# Patient Record
Sex: MALE | Race: BLACK OR AFRICAN AMERICAN | NOT HISPANIC OR LATINO | URBAN - METROPOLITAN AREA
[De-identification: names, ages, dates, MRNs, and addresses within clinical notes are randomized per-mention and may not be internally consistent; named-entity substitution may affect disease eponyms.]

---

## 2017-10-27 ENCOUNTER — OUTPATIENT (OUTPATIENT)
Dept: OUTPATIENT SERVICES | Facility: HOSPITAL | Age: 52
LOS: 1 days | End: 2017-10-27
Payer: MEDICARE

## 2017-10-27 DIAGNOSIS — Z98.89 OTHER SPECIFIED POSTPROCEDURAL STATES: Chronic | ICD-10-CM

## 2017-10-27 DIAGNOSIS — Z51.89 ENCOUNTER FOR OTHER SPECIFIED AFTERCARE: ICD-10-CM

## 2017-10-27 DIAGNOSIS — M25.562 PAIN IN LEFT KNEE: ICD-10-CM

## 2017-10-27 DIAGNOSIS — M62.830 MUSCLE SPASM OF BACK: ICD-10-CM

## 2017-11-21 PROCEDURE — G8978: CPT | Mod: CM

## 2017-11-21 PROCEDURE — 97010 HOT OR COLD PACKS THERAPY: CPT

## 2017-11-21 PROCEDURE — 97110 THERAPEUTIC EXERCISES: CPT

## 2017-11-21 PROCEDURE — 97140 MANUAL THERAPY 1/> REGIONS: CPT

## 2017-11-21 PROCEDURE — G8979: CPT | Mod: CL

## 2017-11-21 PROCEDURE — 97163 PT EVAL HIGH COMPLEX 45 MIN: CPT

## 2017-12-13 ENCOUNTER — OTHER (OUTPATIENT)
Age: 52
End: 2017-12-13

## 2017-12-13 DIAGNOSIS — M25.569 PAIN IN UNSPECIFIED KNEE: ICD-10-CM

## 2017-12-14 ENCOUNTER — APPOINTMENT (OUTPATIENT)
Dept: ORTHOPEDIC SURGERY | Facility: CLINIC | Age: 52
End: 2017-12-14
Payer: MEDICARE

## 2017-12-14 VITALS
TEMPERATURE: 98 F | SYSTOLIC BLOOD PRESSURE: 144 MMHG | HEIGHT: 69 IN | BODY MASS INDEX: 26.07 KG/M2 | HEART RATE: 88 BPM | DIASTOLIC BLOOD PRESSURE: 90 MMHG | WEIGHT: 176 LBS

## 2017-12-14 DIAGNOSIS — Z80.9 FAMILY HISTORY OF MALIGNANT NEOPLASM, UNSPECIFIED: ICD-10-CM

## 2017-12-14 DIAGNOSIS — Z87.09 PERSONAL HISTORY OF OTHER DISEASES OF THE RESPIRATORY SYSTEM: ICD-10-CM

## 2017-12-14 DIAGNOSIS — Z86.39 PERSONAL HISTORY OF OTHER ENDOCRINE, NUTRITIONAL AND METABOLIC DISEASE: ICD-10-CM

## 2017-12-14 DIAGNOSIS — Z82.61 FAMILY HISTORY OF ARTHRITIS: ICD-10-CM

## 2017-12-14 DIAGNOSIS — Z87.891 PERSONAL HISTORY OF NICOTINE DEPENDENCE: ICD-10-CM

## 2017-12-14 DIAGNOSIS — Z86.79 PERSONAL HISTORY OF OTHER DISEASES OF THE CIRCULATORY SYSTEM: ICD-10-CM

## 2017-12-14 DIAGNOSIS — Z87.39 PERSONAL HISTORY OF OTHER DISEASES OF THE MUSCULOSKELETAL SYSTEM AND CONNECTIVE TISSUE: ICD-10-CM

## 2017-12-14 DIAGNOSIS — Z78.9 OTHER SPECIFIED HEALTH STATUS: ICD-10-CM

## 2017-12-14 PROCEDURE — 99203 OFFICE O/P NEW LOW 30 MIN: CPT

## 2017-12-14 PROCEDURE — 73562 X-RAY EXAM OF KNEE 3: CPT | Mod: LT

## 2018-01-25 ENCOUNTER — APPOINTMENT (OUTPATIENT)
Dept: ORTHOPEDIC SURGERY | Facility: CLINIC | Age: 53
End: 2018-01-25
Payer: MEDICARE

## 2018-01-25 VITALS
SYSTOLIC BLOOD PRESSURE: 148 MMHG | HEART RATE: 89 BPM | DIASTOLIC BLOOD PRESSURE: 93 MMHG | HEIGHT: 69 IN | BODY MASS INDEX: 26.07 KG/M2 | WEIGHT: 176 LBS

## 2018-01-25 PROCEDURE — 99213 OFFICE O/P EST LOW 20 MIN: CPT

## 2018-01-31 ENCOUNTER — OUTPATIENT (OUTPATIENT)
Dept: OUTPATIENT SERVICES | Facility: HOSPITAL | Age: 53
LOS: 1 days | End: 2018-01-31
Payer: MEDICARE

## 2018-01-31 DIAGNOSIS — Z51.89 ENCOUNTER FOR OTHER SPECIFIED AFTERCARE: ICD-10-CM

## 2018-01-31 DIAGNOSIS — Z96.652 PRESENCE OF LEFT ARTIFICIAL KNEE JOINT: ICD-10-CM

## 2018-01-31 DIAGNOSIS — Z98.89 OTHER SPECIFIED POSTPROCEDURAL STATES: Chronic | ICD-10-CM

## 2018-01-31 DIAGNOSIS — S83.412D SPRAIN OF MEDIAL COLLATERAL LIGAMENT OF LEFT KNEE, SUBSEQUENT ENCOUNTER: ICD-10-CM

## 2018-02-21 PROCEDURE — G8979: CPT | Mod: CL

## 2018-02-21 PROCEDURE — 97110 THERAPEUTIC EXERCISES: CPT

## 2018-02-21 PROCEDURE — G8978: CPT | Mod: CM

## 2018-02-21 PROCEDURE — 97163 PT EVAL HIGH COMPLEX 45 MIN: CPT

## 2018-02-21 PROCEDURE — 97010 HOT OR COLD PACKS THERAPY: CPT

## 2018-03-15 ENCOUNTER — APPOINTMENT (OUTPATIENT)
Dept: ORTHOPEDIC SURGERY | Facility: CLINIC | Age: 53
End: 2018-03-15
Payer: MEDICARE

## 2018-03-15 VITALS
WEIGHT: 176 LBS | DIASTOLIC BLOOD PRESSURE: 95 MMHG | BODY MASS INDEX: 26.07 KG/M2 | SYSTOLIC BLOOD PRESSURE: 150 MMHG | HEIGHT: 69 IN | HEART RATE: 88 BPM

## 2018-03-15 DIAGNOSIS — S83.412D SPRAIN OF MEDIAL COLLATERAL LIGAMENT OF LEFT KNEE, SUBSEQUENT ENCOUNTER: ICD-10-CM

## 2018-03-15 PROCEDURE — 73562 X-RAY EXAM OF KNEE 3: CPT | Mod: LT

## 2018-03-15 PROCEDURE — 99213 OFFICE O/P EST LOW 20 MIN: CPT

## 2018-04-05 ENCOUNTER — OTHER (OUTPATIENT)
Age: 53
End: 2018-04-05

## 2018-04-12 ENCOUNTER — APPOINTMENT (OUTPATIENT)
Dept: ORTHOPEDIC SURGERY | Facility: CLINIC | Age: 53
End: 2018-04-12

## 2018-04-18 ENCOUNTER — EMERGENCY (EMERGENCY)
Facility: HOSPITAL | Age: 53
LOS: 1 days | Discharge: HOME | End: 2018-04-18
Attending: EMERGENCY MEDICINE | Admitting: EMERGENCY MEDICINE
Payer: MEDICARE

## 2018-04-18 VITALS — HEIGHT: 69 IN | WEIGHT: 192.9 LBS

## 2018-04-18 VITALS
DIASTOLIC BLOOD PRESSURE: 77 MMHG | RESPIRATION RATE: 18 BRPM | HEART RATE: 81 BPM | SYSTOLIC BLOOD PRESSURE: 146 MMHG | OXYGEN SATURATION: 100 %

## 2018-04-18 DIAGNOSIS — Z98.89 OTHER SPECIFIED POSTPROCEDURAL STATES: Chronic | ICD-10-CM

## 2018-04-18 LAB
ALBUMIN SERPL ELPH-MCNC: 4.8 G/DL — SIGNIFICANT CHANGE UP (ref 3.3–5.2)
ALP SERPL-CCNC: 45 U/L — SIGNIFICANT CHANGE UP (ref 40–120)
ALT FLD-CCNC: 35 U/L — SIGNIFICANT CHANGE UP
ANION GAP SERPL CALC-SCNC: 17 MMOL/L — SIGNIFICANT CHANGE UP (ref 5–17)
APPEARANCE UR: CLEAR — SIGNIFICANT CHANGE UP
AST SERPL-CCNC: 21 U/L — SIGNIFICANT CHANGE UP
BASOPHILS # BLD AUTO: 0 K/UL — SIGNIFICANT CHANGE UP (ref 0–0.2)
BASOPHILS NFR BLD AUTO: 0.2 % — SIGNIFICANT CHANGE UP (ref 0–2)
BILIRUB SERPL-MCNC: 0.5 MG/DL — SIGNIFICANT CHANGE UP (ref 0.4–2)
BILIRUB UR-MCNC: NEGATIVE — SIGNIFICANT CHANGE UP
BUN SERPL-MCNC: 9 MG/DL — SIGNIFICANT CHANGE UP (ref 8–20)
CALCIUM SERPL-MCNC: 10.1 MG/DL — SIGNIFICANT CHANGE UP (ref 8.6–10.2)
CHLORIDE SERPL-SCNC: 98 MMOL/L — SIGNIFICANT CHANGE UP (ref 98–107)
CO2 SERPL-SCNC: 23 MMOL/L — SIGNIFICANT CHANGE UP (ref 22–29)
COLOR SPEC: YELLOW — SIGNIFICANT CHANGE UP
CREAT SERPL-MCNC: 0.98 MG/DL — SIGNIFICANT CHANGE UP (ref 0.5–1.3)
DIFF PNL FLD: ABNORMAL
EOSINOPHIL # BLD AUTO: 0 K/UL — SIGNIFICANT CHANGE UP (ref 0–0.5)
EOSINOPHIL NFR BLD AUTO: 0 % — SIGNIFICANT CHANGE UP (ref 0–5)
EPI CELLS # UR: SIGNIFICANT CHANGE UP
GLUCOSE SERPL-MCNC: 124 MG/DL — HIGH (ref 70–115)
GLUCOSE UR QL: NEGATIVE MG/DL — SIGNIFICANT CHANGE UP
HCT VFR BLD CALC: 47.7 % — SIGNIFICANT CHANGE UP (ref 42–52)
HGB BLD-MCNC: 16.7 G/DL — SIGNIFICANT CHANGE UP (ref 14–18)
KETONES UR-MCNC: ABNORMAL
LEUKOCYTE ESTERASE UR-ACNC: ABNORMAL
LIDOCAIN IGE QN: 24 U/L — SIGNIFICANT CHANGE UP (ref 22–51)
LYMPHOCYTES # BLD AUTO: 1.8 K/UL — SIGNIFICANT CHANGE UP (ref 1–4.8)
LYMPHOCYTES # BLD AUTO: 21.7 % — SIGNIFICANT CHANGE UP (ref 20–55)
MCHC RBC-ENTMCNC: 31.2 PG — HIGH (ref 27–31)
MCHC RBC-ENTMCNC: 35 G/DL — SIGNIFICANT CHANGE UP (ref 32–36)
MCV RBC AUTO: 89.2 FL — SIGNIFICANT CHANGE UP (ref 80–94)
MONOCYTES # BLD AUTO: 0.7 K/UL — SIGNIFICANT CHANGE UP (ref 0–0.8)
MONOCYTES NFR BLD AUTO: 8.4 % — SIGNIFICANT CHANGE UP (ref 3–10)
NEUTROPHILS # BLD AUTO: 5.6 K/UL — SIGNIFICANT CHANGE UP (ref 1.8–8)
NEUTROPHILS NFR BLD AUTO: 69.5 % — SIGNIFICANT CHANGE UP (ref 37–73)
NITRITE UR-MCNC: NEGATIVE — SIGNIFICANT CHANGE UP
PH UR: 6 — SIGNIFICANT CHANGE UP (ref 5–8)
PLATELET # BLD AUTO: 261 K/UL — SIGNIFICANT CHANGE UP (ref 150–400)
POTASSIUM SERPL-MCNC: 3.6 MMOL/L — SIGNIFICANT CHANGE UP (ref 3.5–5.3)
POTASSIUM SERPL-SCNC: 3.6 MMOL/L — SIGNIFICANT CHANGE UP (ref 3.5–5.3)
PROT SERPL-MCNC: 8.5 G/DL — SIGNIFICANT CHANGE UP (ref 6.6–8.7)
PROT UR-MCNC: 30 MG/DL
RBC # BLD: 5.35 M/UL — SIGNIFICANT CHANGE UP (ref 4.6–6.2)
RBC # FLD: 13.2 % — SIGNIFICANT CHANGE UP (ref 11–15.6)
RBC CASTS # UR COMP ASSIST: SIGNIFICANT CHANGE UP /HPF (ref 0–4)
SODIUM SERPL-SCNC: 138 MMOL/L — SIGNIFICANT CHANGE UP (ref 135–145)
SP GR SPEC: 1.02 — SIGNIFICANT CHANGE UP (ref 1.01–1.02)
UROBILINOGEN FLD QL: NEGATIVE MG/DL — SIGNIFICANT CHANGE UP
WBC # BLD: 8.1 K/UL — SIGNIFICANT CHANGE UP (ref 4.8–10.8)
WBC # FLD AUTO: 8.1 K/UL — SIGNIFICANT CHANGE UP (ref 4.8–10.8)
WBC UR QL: SIGNIFICANT CHANGE UP

## 2018-04-18 PROCEDURE — 83690 ASSAY OF LIPASE: CPT

## 2018-04-18 PROCEDURE — 80053 COMPREHEN METABOLIC PANEL: CPT

## 2018-04-18 PROCEDURE — 81001 URINALYSIS AUTO W/SCOPE: CPT

## 2018-04-18 PROCEDURE — 99284 EMERGENCY DEPT VISIT MOD MDM: CPT | Mod: 25

## 2018-04-18 PROCEDURE — 74176 CT ABD & PELVIS W/O CONTRAST: CPT | Mod: 26

## 2018-04-18 PROCEDURE — 85027 COMPLETE CBC AUTOMATED: CPT

## 2018-04-18 PROCEDURE — 74176 CT ABD & PELVIS W/O CONTRAST: CPT

## 2018-04-18 PROCEDURE — 99284 EMERGENCY DEPT VISIT MOD MDM: CPT

## 2018-04-18 PROCEDURE — 96375 TX/PRO/DX INJ NEW DRUG ADDON: CPT

## 2018-04-18 PROCEDURE — 36415 COLL VENOUS BLD VENIPUNCTURE: CPT

## 2018-04-18 PROCEDURE — 96374 THER/PROPH/DIAG INJ IV PUSH: CPT

## 2018-04-18 RX ORDER — MORPHINE SULFATE 50 MG/1
8 CAPSULE, EXTENDED RELEASE ORAL ONCE
Qty: 0 | Refills: 0 | Status: DISCONTINUED | OUTPATIENT
Start: 2018-04-18 | End: 2018-04-18

## 2018-04-18 RX ORDER — SODIUM CHLORIDE 9 MG/ML
3 INJECTION INTRAMUSCULAR; INTRAVENOUS; SUBCUTANEOUS ONCE
Qty: 0 | Refills: 0 | Status: COMPLETED | OUTPATIENT
Start: 2018-04-18 | End: 2018-04-18

## 2018-04-18 RX ORDER — MORPHINE SULFATE 50 MG/1
4 CAPSULE, EXTENDED RELEASE ORAL ONCE
Qty: 0 | Refills: 0 | Status: COMPLETED | OUTPATIENT
Start: 2018-04-18 | End: 2018-04-18

## 2018-04-18 RX ORDER — ONDANSETRON 8 MG/1
4 TABLET, FILM COATED ORAL ONCE
Qty: 0 | Refills: 0 | Status: COMPLETED | OUTPATIENT
Start: 2018-04-18 | End: 2018-04-18

## 2018-04-18 RX ORDER — KETOROLAC TROMETHAMINE 30 MG/ML
30 SYRINGE (ML) INJECTION ONCE
Qty: 0 | Refills: 0 | Status: DISCONTINUED | OUTPATIENT
Start: 2018-04-18 | End: 2018-04-18

## 2018-04-18 RX ORDER — CYCLOBENZAPRINE HYDROCHLORIDE 10 MG/1
1 TABLET, FILM COATED ORAL
Qty: 30 | Refills: 0 | OUTPATIENT
Start: 2018-04-18 | End: 2018-04-27

## 2018-04-18 RX ADMIN — ONDANSETRON 4 MILLIGRAM(S): 8 TABLET, FILM COATED ORAL at 08:43

## 2018-04-18 RX ADMIN — SODIUM CHLORIDE 3 MILLILITER(S): 9 INJECTION INTRAMUSCULAR; INTRAVENOUS; SUBCUTANEOUS at 08:35

## 2018-04-18 RX ADMIN — Medication 30 MILLIGRAM(S): at 08:39

## 2018-04-18 RX ADMIN — MORPHINE SULFATE 8 MILLIGRAM(S): 50 CAPSULE, EXTENDED RELEASE ORAL at 08:51

## 2018-04-18 NOTE — ED ADULT NURSE REASSESSMENT NOTE - NS ED NURSE REASSESS COMMENT FT1
wife at desk and stating 'can he get anything for pain' 'its getting worse and worse by the minute'   Dr Crespo at bedside

## 2018-04-18 NOTE — ED ADULT NURSE REASSESSMENT NOTE - NS ED NURSE REASSESS COMMENT FT1
pt comfortable sitting in RW with wife, "pain is better, its about a - right now"   waiting MD dispo

## 2018-04-18 NOTE — ED PROVIDER NOTE - OBJECTIVE STATEMENT
c/o right flank pain radiating to the groin associated with nausea and vomiting. hx of knee surgery 2 months ago takes 5 percocets dialy 10 mgms each daily last took percocet yesterday Pain present for the last 4-5 days hx of diabetes and bipolar disorder. no smoking no drinking no allergies no hx of stones

## 2018-04-18 NOTE — ED ADULT NURSE NOTE - OBJECTIVE STATEMENT
pt presents to ED with right flank pain x one week. pt c/o n/v since yesterday. pt has see by PMD and given RX of oxycodone for pain. denies urinary complaints. a&ox3. breathing si even and unlabored. will continue to monitor and reassess

## 2018-04-18 NOTE — ED PROVIDER NOTE - CONSTITUTIONAL, MLM
normal... Well appearing, well nourished, awake, alert, oriented to person, place, time/situation and in severe distress

## 2018-05-30 ENCOUNTER — OUTPATIENT (OUTPATIENT)
Dept: OUTPATIENT SERVICES | Facility: HOSPITAL | Age: 53
LOS: 1 days | End: 2018-05-30
Payer: MEDICARE

## 2018-05-30 DIAGNOSIS — Z51.89 ENCOUNTER FOR OTHER SPECIFIED AFTERCARE: ICD-10-CM

## 2018-05-30 DIAGNOSIS — M54.5 LOW BACK PAIN: ICD-10-CM

## 2018-05-30 DIAGNOSIS — Z98.89 OTHER SPECIFIED POSTPROCEDURAL STATES: Chronic | ICD-10-CM

## 2018-08-08 PROCEDURE — 97163 PT EVAL HIGH COMPLEX 45 MIN: CPT

## 2018-08-08 PROCEDURE — 97010 HOT OR COLD PACKS THERAPY: CPT

## 2018-08-08 PROCEDURE — G8978: CPT | Mod: CM

## 2018-08-08 PROCEDURE — 97110 THERAPEUTIC EXERCISES: CPT

## 2018-08-08 PROCEDURE — 97140 MANUAL THERAPY 1/> REGIONS: CPT

## 2018-08-08 PROCEDURE — G8979: CPT | Mod: CL

## 2018-08-16 ENCOUNTER — EMERGENCY (EMERGENCY)
Facility: HOSPITAL | Age: 53
LOS: 1 days | Discharge: HOME | End: 2018-08-16
Attending: EMERGENCY MEDICINE
Payer: MEDICARE

## 2018-08-16 VITALS
OXYGEN SATURATION: 100 % | RESPIRATION RATE: 18 BRPM | DIASTOLIC BLOOD PRESSURE: 84 MMHG | SYSTOLIC BLOOD PRESSURE: 157 MMHG | HEART RATE: 88 BPM | TEMPERATURE: 98 F

## 2018-08-16 VITALS
DIASTOLIC BLOOD PRESSURE: 87 MMHG | TEMPERATURE: 98 F | WEIGHT: 184.97 LBS | SYSTOLIC BLOOD PRESSURE: 151 MMHG | OXYGEN SATURATION: 100 % | RESPIRATION RATE: 18 BRPM | HEIGHT: 67 IN | HEART RATE: 80 BPM

## 2018-08-16 DIAGNOSIS — Z98.89 OTHER SPECIFIED POSTPROCEDURAL STATES: Chronic | ICD-10-CM

## 2018-08-16 LAB
ALBUMIN SERPL ELPH-MCNC: 5.1 G/DL — SIGNIFICANT CHANGE UP (ref 3.3–5.2)
ALP SERPL-CCNC: 46 U/L — SIGNIFICANT CHANGE UP (ref 40–120)
ALT FLD-CCNC: 54 U/L — HIGH
ANION GAP SERPL CALC-SCNC: 18 MMOL/L — HIGH (ref 5–17)
AST SERPL-CCNC: 35 U/L — SIGNIFICANT CHANGE UP
BASOPHILS # BLD AUTO: 0 K/UL — SIGNIFICANT CHANGE UP (ref 0–0.2)
BASOPHILS NFR BLD AUTO: 0.2 % — SIGNIFICANT CHANGE UP (ref 0–2)
BILIRUB SERPL-MCNC: 0.4 MG/DL — SIGNIFICANT CHANGE UP (ref 0.4–2)
BUN SERPL-MCNC: 17 MG/DL — SIGNIFICANT CHANGE UP (ref 8–20)
CALCIUM SERPL-MCNC: 10.1 MG/DL — SIGNIFICANT CHANGE UP (ref 8.6–10.2)
CHLORIDE SERPL-SCNC: 104 MMOL/L — SIGNIFICANT CHANGE UP (ref 98–107)
CK MB CFR SERPL CALC: 2.8 NG/ML — SIGNIFICANT CHANGE UP (ref 0–6.7)
CK SERPL-CCNC: 460 U/L — HIGH (ref 30–200)
CO2 SERPL-SCNC: 21 MMOL/L — LOW (ref 22–29)
CREAT SERPL-MCNC: 0.95 MG/DL — SIGNIFICANT CHANGE UP (ref 0.5–1.3)
EOSINOPHIL # BLD AUTO: 0 K/UL — SIGNIFICANT CHANGE UP (ref 0–0.5)
EOSINOPHIL NFR BLD AUTO: 0.1 % — SIGNIFICANT CHANGE UP (ref 0–5)
GLUCOSE SERPL-MCNC: 136 MG/DL — HIGH (ref 70–115)
HCT VFR BLD CALC: 46.1 % — SIGNIFICANT CHANGE UP (ref 42–52)
HGB BLD-MCNC: 15.8 G/DL — SIGNIFICANT CHANGE UP (ref 14–18)
LIDOCAIN IGE QN: 90 U/L — HIGH (ref 22–51)
LYMPHOCYTES # BLD AUTO: 1.8 K/UL — SIGNIFICANT CHANGE UP (ref 1–4.8)
LYMPHOCYTES # BLD AUTO: 18.3 % — LOW (ref 20–55)
MCHC RBC-ENTMCNC: 30.9 PG — SIGNIFICANT CHANGE UP (ref 27–31)
MCHC RBC-ENTMCNC: 34.3 G/DL — SIGNIFICANT CHANGE UP (ref 32–36)
MCV RBC AUTO: 90.2 FL — SIGNIFICANT CHANGE UP (ref 80–94)
MONOCYTES # BLD AUTO: 0.8 K/UL — SIGNIFICANT CHANGE UP (ref 0–0.8)
MONOCYTES NFR BLD AUTO: 8.3 % — SIGNIFICANT CHANGE UP (ref 3–10)
NEUTROPHILS # BLD AUTO: 7.3 K/UL — SIGNIFICANT CHANGE UP (ref 1.8–8)
NEUTROPHILS NFR BLD AUTO: 72.8 % — SIGNIFICANT CHANGE UP (ref 37–73)
PLATELET # BLD AUTO: 284 K/UL — SIGNIFICANT CHANGE UP (ref 150–400)
POTASSIUM SERPL-MCNC: 3.8 MMOL/L — SIGNIFICANT CHANGE UP (ref 3.5–5.3)
POTASSIUM SERPL-SCNC: 3.8 MMOL/L — SIGNIFICANT CHANGE UP (ref 3.5–5.3)
PROT SERPL-MCNC: 8.6 G/DL — SIGNIFICANT CHANGE UP (ref 6.6–8.7)
RBC # BLD: 5.11 M/UL — SIGNIFICANT CHANGE UP (ref 4.6–6.2)
RBC # FLD: 13.3 % — SIGNIFICANT CHANGE UP (ref 11–15.6)
SODIUM SERPL-SCNC: 143 MMOL/L — SIGNIFICANT CHANGE UP (ref 135–145)
WBC # BLD: 10 K/UL — SIGNIFICANT CHANGE UP (ref 4.8–10.8)
WBC # FLD AUTO: 10 K/UL — SIGNIFICANT CHANGE UP (ref 4.8–10.8)

## 2018-08-16 PROCEDURE — 82553 CREATINE MB FRACTION: CPT

## 2018-08-16 PROCEDURE — 83690 ASSAY OF LIPASE: CPT

## 2018-08-16 PROCEDURE — 99284 EMERGENCY DEPT VISIT MOD MDM: CPT | Mod: 25

## 2018-08-16 PROCEDURE — 96361 HYDRATE IV INFUSION ADD-ON: CPT

## 2018-08-16 PROCEDURE — 82550 ASSAY OF CK (CPK): CPT

## 2018-08-16 PROCEDURE — 99284 EMERGENCY DEPT VISIT MOD MDM: CPT

## 2018-08-16 PROCEDURE — 36415 COLL VENOUS BLD VENIPUNCTURE: CPT

## 2018-08-16 PROCEDURE — 85027 COMPLETE CBC AUTOMATED: CPT

## 2018-08-16 PROCEDURE — 80053 COMPREHEN METABOLIC PANEL: CPT

## 2018-08-16 PROCEDURE — 74177 CT ABD & PELVIS W/CONTRAST: CPT | Mod: 26

## 2018-08-16 PROCEDURE — 96375 TX/PRO/DX INJ NEW DRUG ADDON: CPT

## 2018-08-16 PROCEDURE — 96374 THER/PROPH/DIAG INJ IV PUSH: CPT | Mod: XU

## 2018-08-16 PROCEDURE — 74177 CT ABD & PELVIS W/CONTRAST: CPT

## 2018-08-16 RX ORDER — SODIUM CHLORIDE 9 MG/ML
1000 INJECTION INTRAMUSCULAR; INTRAVENOUS; SUBCUTANEOUS
Qty: 0 | Refills: 0 | Status: DISCONTINUED | OUTPATIENT
Start: 2018-08-16 | End: 2018-08-21

## 2018-08-16 RX ORDER — KETOROLAC TROMETHAMINE 30 MG/ML
15 SYRINGE (ML) INJECTION ONCE
Qty: 0 | Refills: 0 | Status: DISCONTINUED | OUTPATIENT
Start: 2018-08-16 | End: 2018-08-16

## 2018-08-16 RX ORDER — HALOPERIDOL DECANOATE 100 MG/ML
5 INJECTION INTRAMUSCULAR ONCE
Qty: 0 | Refills: 0 | Status: COMPLETED | OUTPATIENT
Start: 2018-08-16 | End: 2018-08-16

## 2018-08-16 RX ORDER — SODIUM CHLORIDE 9 MG/ML
1000 INJECTION INTRAMUSCULAR; INTRAVENOUS; SUBCUTANEOUS ONCE
Qty: 0 | Refills: 0 | Status: COMPLETED | OUTPATIENT
Start: 2018-08-16 | End: 2018-08-16

## 2018-08-16 RX ORDER — FAMOTIDINE 10 MG/ML
20 INJECTION INTRAVENOUS ONCE
Qty: 0 | Refills: 0 | Status: COMPLETED | OUTPATIENT
Start: 2018-08-16 | End: 2018-08-16

## 2018-08-16 RX ORDER — MORPHINE SULFATE 50 MG/1
4 CAPSULE, EXTENDED RELEASE ORAL ONCE
Qty: 0 | Refills: 0 | Status: DISCONTINUED | OUTPATIENT
Start: 2018-08-16 | End: 2018-08-16

## 2018-08-16 RX ORDER — SODIUM CHLORIDE 9 MG/ML
3 INJECTION INTRAMUSCULAR; INTRAVENOUS; SUBCUTANEOUS ONCE
Qty: 0 | Refills: 0 | Status: COMPLETED | OUTPATIENT
Start: 2018-08-16 | End: 2018-08-16

## 2018-08-16 RX ORDER — ONDANSETRON 8 MG/1
4 TABLET, FILM COATED ORAL ONCE
Qty: 0 | Refills: 0 | Status: COMPLETED | OUTPATIENT
Start: 2018-08-16 | End: 2018-08-16

## 2018-08-16 RX ADMIN — SODIUM CHLORIDE 3 MILLILITER(S): 9 INJECTION INTRAMUSCULAR; INTRAVENOUS; SUBCUTANEOUS at 08:56

## 2018-08-16 RX ADMIN — ONDANSETRON 4 MILLIGRAM(S): 8 TABLET, FILM COATED ORAL at 08:56

## 2018-08-16 RX ADMIN — MORPHINE SULFATE 4 MILLIGRAM(S): 50 CAPSULE, EXTENDED RELEASE ORAL at 08:56

## 2018-08-16 RX ADMIN — HALOPERIDOL DECANOATE 5 MILLIGRAM(S): 100 INJECTION INTRAMUSCULAR at 12:59

## 2018-08-16 RX ADMIN — FAMOTIDINE 20 MILLIGRAM(S): 10 INJECTION INTRAVENOUS at 08:56

## 2018-08-16 RX ADMIN — SODIUM CHLORIDE 125 MILLILITER(S): 9 INJECTION INTRAMUSCULAR; INTRAVENOUS; SUBCUTANEOUS at 09:30

## 2018-08-16 RX ADMIN — SODIUM CHLORIDE 1000 MILLILITER(S): 9 INJECTION INTRAMUSCULAR; INTRAVENOUS; SUBCUTANEOUS at 09:22

## 2018-08-16 RX ADMIN — SODIUM CHLORIDE 1000 MILLILITER(S): 9 INJECTION INTRAMUSCULAR; INTRAVENOUS; SUBCUTANEOUS at 08:58

## 2018-08-16 RX ADMIN — MORPHINE SULFATE 4 MILLIGRAM(S): 50 CAPSULE, EXTENDED RELEASE ORAL at 09:22

## 2018-08-16 RX ADMIN — SODIUM CHLORIDE 1000 MILLILITER(S): 9 INJECTION INTRAMUSCULAR; INTRAVENOUS; SUBCUTANEOUS at 14:09

## 2018-08-16 RX ADMIN — Medication 15 MILLIGRAM(S): at 09:22

## 2018-08-16 RX ADMIN — Medication 15 MILLIGRAM(S): at 08:56

## 2018-08-16 NOTE — ED PROVIDER NOTE - OBJECTIVE STATEMENT
54 yo male presents for evaluation of persistent nausea and vomiting for the past three days. He was diagnosed with gastroenteritis about one week ago.

## 2018-08-16 NOTE — ED ADULT NURSE NOTE - OBJECTIVE STATEMENT
pt alert and awake x3, arrived to ED with abd pain associated with n/v x3 days, denies any abd surgeries, denies HA, denies chest pain/sob. will continue to monitor.

## 2018-08-16 NOTE — ED ADULT NURSE NOTE - NSIMPLEMENTINTERV_GEN_ALL_ED
Implemented All Universal Safety Interventions:  [place] to call system. Call bell, personal items and telephone within reach. Instruct patient to call for assistance. Room bathroom lighting operational. Non-slip footwear when patient is off stretcher. Physically safe environment: no spills, clutter or unnecessary equipment. Stretcher in lowest position, wheels locked, appropriate side rails in place.

## 2018-09-12 ENCOUNTER — APPOINTMENT (OUTPATIENT)
Dept: GASTROENTEROLOGY | Facility: CLINIC | Age: 53
End: 2018-09-12
Payer: MEDICARE

## 2018-09-12 VITALS
HEART RATE: 74 BPM | SYSTOLIC BLOOD PRESSURE: 154 MMHG | OXYGEN SATURATION: 98 % | BODY MASS INDEX: 25.92 KG/M2 | HEIGHT: 69 IN | WEIGHT: 175 LBS | DIASTOLIC BLOOD PRESSURE: 104 MMHG

## 2018-09-12 PROCEDURE — 99204 OFFICE O/P NEW MOD 45 MIN: CPT

## 2018-09-12 PROCEDURE — 82270 OCCULT BLOOD FECES: CPT

## 2018-09-23 ENCOUNTER — FORM ENCOUNTER (OUTPATIENT)
Age: 53
End: 2018-09-23

## 2018-09-24 ENCOUNTER — OUTPATIENT (OUTPATIENT)
Dept: OUTPATIENT SERVICES | Facility: HOSPITAL | Age: 53
LOS: 1 days | End: 2018-09-24
Payer: MEDICARE

## 2018-09-24 DIAGNOSIS — R11.14 BILIOUS VOMITING: ICD-10-CM

## 2018-09-24 DIAGNOSIS — Z98.89 OTHER SPECIFIED POSTPROCEDURAL STATES: Chronic | ICD-10-CM

## 2018-09-24 LAB — GLUCOSE BLDC GLUCOMTR-MCNC: 111 MG/DL — HIGH (ref 70–99)

## 2018-09-24 PROCEDURE — 78264 GASTRIC EMPTYING IMG STUDY: CPT | Mod: 26,GC

## 2018-09-24 PROCEDURE — 76700 US EXAM ABDOM COMPLETE: CPT | Mod: 26

## 2018-09-25 ENCOUNTER — INPATIENT (INPATIENT)
Facility: HOSPITAL | Age: 53
LOS: 0 days | Discharge: HOME | DRG: 392 | End: 2018-09-26
Attending: INTERNAL MEDICINE | Admitting: FAMILY MEDICINE
Payer: COMMERCIAL

## 2018-09-25 ENCOUNTER — TRANSCRIPTION ENCOUNTER (OUTPATIENT)
Age: 53
End: 2018-09-25

## 2018-09-25 VITALS — WEIGHT: 169.98 LBS | HEIGHT: 69 IN

## 2018-09-25 DIAGNOSIS — Z98.89 OTHER SPECIFIED POSTPROCEDURAL STATES: Chronic | ICD-10-CM

## 2018-09-25 DIAGNOSIS — Z98.890 OTHER SPECIFIED POSTPROCEDURAL STATES: Chronic | ICD-10-CM

## 2018-09-25 DIAGNOSIS — R11.2 NAUSEA WITH VOMITING, UNSPECIFIED: ICD-10-CM

## 2018-09-25 LAB
ALBUMIN SERPL ELPH-MCNC: 4.6 G/DL — SIGNIFICANT CHANGE UP (ref 3.3–5.2)
ALP SERPL-CCNC: 41 U/L — SIGNIFICANT CHANGE UP (ref 40–120)
ALT FLD-CCNC: 27 U/L — SIGNIFICANT CHANGE UP
ANION GAP SERPL CALC-SCNC: 16 MMOL/L — SIGNIFICANT CHANGE UP (ref 5–17)
APPEARANCE UR: CLEAR — SIGNIFICANT CHANGE UP
AST SERPL-CCNC: 18 U/L — SIGNIFICANT CHANGE UP
BASOPHILS # BLD AUTO: 0 K/UL — SIGNIFICANT CHANGE UP (ref 0–0.2)
BASOPHILS NFR BLD AUTO: 0.2 % — SIGNIFICANT CHANGE UP (ref 0–2)
BILIRUB SERPL-MCNC: 0.5 MG/DL — SIGNIFICANT CHANGE UP (ref 0.4–2)
BILIRUB UR-MCNC: NEGATIVE — SIGNIFICANT CHANGE UP
BUN SERPL-MCNC: 12 MG/DL — SIGNIFICANT CHANGE UP (ref 8–20)
CALCIUM SERPL-MCNC: 9.7 MG/DL — SIGNIFICANT CHANGE UP (ref 8.6–10.2)
CHLORIDE SERPL-SCNC: 106 MMOL/L — SIGNIFICANT CHANGE UP (ref 98–107)
CK MB CFR SERPL CALC: 1.8 NG/ML — SIGNIFICANT CHANGE UP (ref 0–6.7)
CK SERPL-CCNC: 188 U/L — SIGNIFICANT CHANGE UP (ref 30–200)
CO2 SERPL-SCNC: 20 MMOL/L — LOW (ref 22–29)
COLOR SPEC: YELLOW — SIGNIFICANT CHANGE UP
CREAT SERPL-MCNC: 0.91 MG/DL — SIGNIFICANT CHANGE UP (ref 0.5–1.3)
DIFF PNL FLD: NEGATIVE — SIGNIFICANT CHANGE UP
EOSINOPHIL # BLD AUTO: 0 K/UL — SIGNIFICANT CHANGE UP (ref 0–0.5)
EOSINOPHIL NFR BLD AUTO: 0 % — SIGNIFICANT CHANGE UP (ref 0–5)
GLUCOSE BLDC GLUCOMTR-MCNC: 100 MG/DL — HIGH (ref 70–99)
GLUCOSE BLDC GLUCOMTR-MCNC: 116 MG/DL — HIGH (ref 70–99)
GLUCOSE BLDC GLUCOMTR-MCNC: 77 MG/DL — SIGNIFICANT CHANGE UP (ref 70–99)
GLUCOSE SERPL-MCNC: 152 MG/DL — HIGH (ref 70–115)
GLUCOSE UR QL: NEGATIVE MG/DL — SIGNIFICANT CHANGE UP
HCT VFR BLD CALC: 44.8 % — SIGNIFICANT CHANGE UP (ref 42–52)
HGB BLD-MCNC: 15.1 G/DL — SIGNIFICANT CHANGE UP (ref 14–18)
KETONES UR-MCNC: NEGATIVE — SIGNIFICANT CHANGE UP
LACTATE BLDV-MCNC: 1.2 MMOL/L — SIGNIFICANT CHANGE UP (ref 0.5–2)
LACTATE BLDV-MCNC: 2.9 MMOL/L — HIGH (ref 0.5–2)
LEUKOCYTE ESTERASE UR-ACNC: NEGATIVE — SIGNIFICANT CHANGE UP
LIDOCAIN IGE QN: 32 U/L — SIGNIFICANT CHANGE UP (ref 22–51)
LYMPHOCYTES # BLD AUTO: 1.7 K/UL — SIGNIFICANT CHANGE UP (ref 1–4.8)
LYMPHOCYTES # BLD AUTO: 17.5 % — LOW (ref 20–55)
MCHC RBC-ENTMCNC: 30.8 PG — SIGNIFICANT CHANGE UP (ref 27–31)
MCHC RBC-ENTMCNC: 33.7 G/DL — SIGNIFICANT CHANGE UP (ref 32–36)
MCV RBC AUTO: 91.2 FL — SIGNIFICANT CHANGE UP (ref 80–94)
MONOCYTES # BLD AUTO: 0.6 K/UL — SIGNIFICANT CHANGE UP (ref 0–0.8)
MONOCYTES NFR BLD AUTO: 6.5 % — SIGNIFICANT CHANGE UP (ref 3–10)
NEUTROPHILS # BLD AUTO: 7.5 K/UL — SIGNIFICANT CHANGE UP (ref 1.8–8)
NEUTROPHILS NFR BLD AUTO: 75.5 % — HIGH (ref 37–73)
NITRITE UR-MCNC: NEGATIVE — SIGNIFICANT CHANGE UP
PH UR: 7 — SIGNIFICANT CHANGE UP (ref 5–8)
PLATELET # BLD AUTO: 264 K/UL — SIGNIFICANT CHANGE UP (ref 150–400)
POTASSIUM SERPL-MCNC: 3.6 MMOL/L — SIGNIFICANT CHANGE UP (ref 3.5–5.3)
POTASSIUM SERPL-SCNC: 3.6 MMOL/L — SIGNIFICANT CHANGE UP (ref 3.5–5.3)
PROT SERPL-MCNC: 7.7 G/DL — SIGNIFICANT CHANGE UP (ref 6.6–8.7)
PROT UR-MCNC: NEGATIVE MG/DL — SIGNIFICANT CHANGE UP
RBC # BLD: 4.91 M/UL — SIGNIFICANT CHANGE UP (ref 4.6–6.2)
RBC # FLD: 13.2 % — SIGNIFICANT CHANGE UP (ref 11–15.6)
SODIUM SERPL-SCNC: 142 MMOL/L — SIGNIFICANT CHANGE UP (ref 135–145)
SP GR SPEC: 1 — LOW (ref 1.01–1.02)
UROBILINOGEN FLD QL: NEGATIVE MG/DL — SIGNIFICANT CHANGE UP
VALPROATE SERPL-MCNC: <3.7 UG/ML — LOW (ref 50–100)
WBC # BLD: 9.9 K/UL — SIGNIFICANT CHANGE UP (ref 4.8–10.8)
WBC # FLD AUTO: 9.9 K/UL — SIGNIFICANT CHANGE UP (ref 4.8–10.8)

## 2018-09-25 PROCEDURE — 99222 1ST HOSP IP/OBS MODERATE 55: CPT

## 2018-09-25 PROCEDURE — 99223 1ST HOSP IP/OBS HIGH 75: CPT

## 2018-09-25 PROCEDURE — 74177 CT ABD & PELVIS W/CONTRAST: CPT | Mod: 26

## 2018-09-25 PROCEDURE — 99218: CPT

## 2018-09-25 RX ORDER — DEXTROSE 50 % IN WATER 50 %
25 SYRINGE (ML) INTRAVENOUS ONCE
Qty: 0 | Refills: 0 | Status: DISCONTINUED | OUTPATIENT
Start: 2018-09-25 | End: 2018-09-26

## 2018-09-25 RX ORDER — ENOXAPARIN SODIUM 100 MG/ML
40 INJECTION SUBCUTANEOUS DAILY
Qty: 0 | Refills: 0 | Status: DISCONTINUED | OUTPATIENT
Start: 2018-09-25 | End: 2018-09-26

## 2018-09-25 RX ORDER — INSULIN LISPRO 100/ML
VIAL (ML) SUBCUTANEOUS
Qty: 0 | Refills: 0 | Status: DISCONTINUED | OUTPATIENT
Start: 2018-09-25 | End: 2018-09-26

## 2018-09-25 RX ORDER — METOCLOPRAMIDE HCL 10 MG
10 TABLET ORAL EVERY 6 HOURS
Qty: 0 | Refills: 0 | Status: DISCONTINUED | OUTPATIENT
Start: 2018-09-25 | End: 2018-09-25

## 2018-09-25 RX ORDER — AMLODIPINE BESYLATE 2.5 MG/1
5 TABLET ORAL DAILY
Qty: 0 | Refills: 0 | Status: DISCONTINUED | OUTPATIENT
Start: 2018-09-25 | End: 2018-09-26

## 2018-09-25 RX ORDER — SODIUM CHLORIDE 9 MG/ML
2000 INJECTION INTRAMUSCULAR; INTRAVENOUS; SUBCUTANEOUS ONCE
Qty: 0 | Refills: 0 | Status: COMPLETED | OUTPATIENT
Start: 2018-09-25 | End: 2018-09-25

## 2018-09-25 RX ORDER — GLUCAGON INJECTION, SOLUTION 0.5 MG/.1ML
1 INJECTION, SOLUTION SUBCUTANEOUS ONCE
Qty: 0 | Refills: 0 | Status: DISCONTINUED | OUTPATIENT
Start: 2018-09-25 | End: 2018-09-26

## 2018-09-25 RX ORDER — PANTOPRAZOLE SODIUM 20 MG/1
40 TABLET, DELAYED RELEASE ORAL
Qty: 0 | Refills: 0 | Status: DISCONTINUED | OUTPATIENT
Start: 2018-09-25 | End: 2018-09-25

## 2018-09-25 RX ORDER — AMLODIPINE BESYLATE 2.5 MG/1
0 TABLET ORAL
Qty: 0 | Refills: 0 | COMMUNITY

## 2018-09-25 RX ORDER — INFLUENZA VIRUS VACCINE 15; 15; 15; 15 UG/.5ML; UG/.5ML; UG/.5ML; UG/.5ML
0.5 SUSPENSION INTRAMUSCULAR ONCE
Qty: 0 | Refills: 0 | Status: DISCONTINUED | OUTPATIENT
Start: 2018-09-25 | End: 2018-09-26

## 2018-09-25 RX ORDER — ONDANSETRON 8 MG/1
4 TABLET, FILM COATED ORAL EVERY 6 HOURS
Qty: 0 | Refills: 0 | Status: DISCONTINUED | OUTPATIENT
Start: 2018-09-25 | End: 2018-09-26

## 2018-09-25 RX ORDER — METOCLOPRAMIDE HCL 10 MG
10 TABLET ORAL ONCE
Qty: 0 | Refills: 0 | Status: COMPLETED | OUTPATIENT
Start: 2018-09-25 | End: 2018-09-25

## 2018-09-25 RX ORDER — KETOROLAC TROMETHAMINE 30 MG/ML
15 SYRINGE (ML) INJECTION ONCE
Qty: 0 | Refills: 0 | Status: DISCONTINUED | OUTPATIENT
Start: 2018-09-25 | End: 2018-09-25

## 2018-09-25 RX ORDER — HYDROMORPHONE HYDROCHLORIDE 2 MG/ML
0.5 INJECTION INTRAMUSCULAR; INTRAVENOUS; SUBCUTANEOUS ONCE
Qty: 0 | Refills: 0 | Status: DISCONTINUED | OUTPATIENT
Start: 2018-09-25 | End: 2018-09-25

## 2018-09-25 RX ORDER — PANTOPRAZOLE SODIUM 20 MG/1
40 TABLET, DELAYED RELEASE ORAL DAILY
Qty: 0 | Refills: 0 | Status: DISCONTINUED | OUTPATIENT
Start: 2018-09-25 | End: 2018-09-26

## 2018-09-25 RX ORDER — METOCLOPRAMIDE HCL 10 MG
5 TABLET ORAL EVERY 6 HOURS
Qty: 0 | Refills: 0 | Status: DISCONTINUED | OUTPATIENT
Start: 2018-09-25 | End: 2018-09-25

## 2018-09-25 RX ORDER — SODIUM CHLORIDE 9 MG/ML
3 INJECTION INTRAMUSCULAR; INTRAVENOUS; SUBCUTANEOUS ONCE
Qty: 0 | Refills: 0 | Status: COMPLETED | OUTPATIENT
Start: 2018-09-25 | End: 2018-09-25

## 2018-09-25 RX ORDER — DIVALPROEX SODIUM 500 MG/1
500 TABLET, DELAYED RELEASE ORAL AT BEDTIME
Qty: 0 | Refills: 0 | Status: DISCONTINUED | OUTPATIENT
Start: 2018-09-25 | End: 2018-09-26

## 2018-09-25 RX ORDER — DEXTROSE 50 % IN WATER 50 %
12.5 SYRINGE (ML) INTRAVENOUS ONCE
Qty: 0 | Refills: 0 | Status: DISCONTINUED | OUTPATIENT
Start: 2018-09-25 | End: 2018-09-26

## 2018-09-25 RX ORDER — OXYCODONE HYDROCHLORIDE 5 MG/1
10 TABLET ORAL EVERY 6 HOURS
Qty: 0 | Refills: 0 | Status: DISCONTINUED | OUTPATIENT
Start: 2018-09-25 | End: 2018-09-26

## 2018-09-25 RX ORDER — OXYCODONE HYDROCHLORIDE 5 MG/1
5 TABLET ORAL EVERY 6 HOURS
Qty: 0 | Refills: 0 | Status: DISCONTINUED | OUTPATIENT
Start: 2018-09-25 | End: 2018-09-26

## 2018-09-25 RX ORDER — OXYCODONE HYDROCHLORIDE 5 MG/1
5 TABLET ORAL EVERY 6 HOURS
Qty: 0 | Refills: 0 | Status: DISCONTINUED | OUTPATIENT
Start: 2018-09-25 | End: 2018-09-25

## 2018-09-25 RX ORDER — ACETAMINOPHEN 500 MG
650 TABLET ORAL EVERY 6 HOURS
Qty: 0 | Refills: 0 | Status: DISCONTINUED | OUTPATIENT
Start: 2018-09-25 | End: 2018-09-26

## 2018-09-25 RX ORDER — ONDANSETRON 8 MG/1
4 TABLET, FILM COATED ORAL ONCE
Qty: 0 | Refills: 0 | Status: COMPLETED | OUTPATIENT
Start: 2018-09-25 | End: 2018-09-25

## 2018-09-25 RX ORDER — SODIUM CHLORIDE 9 MG/ML
1000 INJECTION, SOLUTION INTRAVENOUS
Qty: 0 | Refills: 0 | Status: DISCONTINUED | OUTPATIENT
Start: 2018-09-25 | End: 2018-09-26

## 2018-09-25 RX ORDER — OXYCODONE HYDROCHLORIDE 5 MG/1
0 TABLET ORAL
Qty: 0 | Refills: 0 | COMMUNITY

## 2018-09-25 RX ORDER — HYDRALAZINE HCL 50 MG
10 TABLET ORAL EVERY 6 HOURS
Qty: 0 | Refills: 0 | Status: DISCONTINUED | OUTPATIENT
Start: 2018-09-25 | End: 2018-09-26

## 2018-09-25 RX ORDER — METOCLOPRAMIDE HCL 10 MG
10 TABLET ORAL EVERY 6 HOURS
Qty: 0 | Refills: 0 | Status: DISCONTINUED | OUTPATIENT
Start: 2018-09-25 | End: 2018-09-26

## 2018-09-25 RX ORDER — TIZANIDINE 4 MG/1
4 TABLET ORAL AT BEDTIME
Qty: 0 | Refills: 0 | Status: DISCONTINUED | OUTPATIENT
Start: 2018-09-25 | End: 2018-09-26

## 2018-09-25 RX ORDER — DEXTROSE 50 % IN WATER 50 %
15 SYRINGE (ML) INTRAVENOUS ONCE
Qty: 0 | Refills: 0 | Status: DISCONTINUED | OUTPATIENT
Start: 2018-09-25 | End: 2018-09-26

## 2018-09-25 RX ADMIN — ONDANSETRON 4 MILLIGRAM(S): 8 TABLET, FILM COATED ORAL at 17:13

## 2018-09-25 RX ADMIN — SODIUM CHLORIDE 2000 MILLILITER(S): 9 INJECTION INTRAMUSCULAR; INTRAVENOUS; SUBCUTANEOUS at 04:36

## 2018-09-25 RX ADMIN — PANTOPRAZOLE SODIUM 40 MILLIGRAM(S): 20 TABLET, DELAYED RELEASE ORAL at 13:09

## 2018-09-25 RX ADMIN — TIZANIDINE 4 MILLIGRAM(S): 4 TABLET ORAL at 22:48

## 2018-09-25 RX ADMIN — Medication 1 MILLIGRAM(S): at 05:42

## 2018-09-25 RX ADMIN — SODIUM CHLORIDE 100 MILLILITER(S): 9 INJECTION, SOLUTION INTRAVENOUS at 17:20

## 2018-09-25 RX ADMIN — Medication 15 MILLIGRAM(S): at 02:02

## 2018-09-25 RX ADMIN — DIVALPROEX SODIUM 500 MILLIGRAM(S): 500 TABLET, DELAYED RELEASE ORAL at 22:48

## 2018-09-25 RX ADMIN — Medication 10 MILLIGRAM(S): at 02:02

## 2018-09-25 RX ADMIN — AMLODIPINE BESYLATE 5 MILLIGRAM(S): 2.5 TABLET ORAL at 17:09

## 2018-09-25 RX ADMIN — Medication 10 MILLIGRAM(S): at 17:08

## 2018-09-25 RX ADMIN — ENOXAPARIN SODIUM 40 MILLIGRAM(S): 100 INJECTION SUBCUTANEOUS at 17:08

## 2018-09-25 RX ADMIN — SODIUM CHLORIDE 3 MILLILITER(S): 9 INJECTION INTRAMUSCULAR; INTRAVENOUS; SUBCUTANEOUS at 02:02

## 2018-09-25 RX ADMIN — Medication 25 MILLIGRAM(S): at 07:52

## 2018-09-25 RX ADMIN — ONDANSETRON 4 MILLIGRAM(S): 8 TABLET, FILM COATED ORAL at 05:43

## 2018-09-25 RX ADMIN — Medication 25 MILLIGRAM(S): at 08:46

## 2018-09-25 RX ADMIN — HYDROMORPHONE HYDROCHLORIDE 0.5 MILLIGRAM(S): 2 INJECTION INTRAMUSCULAR; INTRAVENOUS; SUBCUTANEOUS at 02:01

## 2018-09-25 RX ADMIN — SODIUM CHLORIDE 2000 MILLILITER(S): 9 INJECTION INTRAMUSCULAR; INTRAVENOUS; SUBCUTANEOUS at 04:33

## 2018-09-25 RX ADMIN — ONDANSETRON 4 MILLIGRAM(S): 8 TABLET, FILM COATED ORAL at 13:10

## 2018-09-25 RX ADMIN — SODIUM CHLORIDE 2000 MILLILITER(S): 9 INJECTION INTRAMUSCULAR; INTRAVENOUS; SUBCUTANEOUS at 07:25

## 2018-09-25 RX ADMIN — ONDANSETRON 4 MILLIGRAM(S): 8 TABLET, FILM COATED ORAL at 22:48

## 2018-09-25 RX ADMIN — SODIUM CHLORIDE 2000 MILLILITER(S): 9 INJECTION INTRAMUSCULAR; INTRAVENOUS; SUBCUTANEOUS at 02:02

## 2018-09-25 RX ADMIN — Medication 15 MILLIGRAM(S): at 03:39

## 2018-09-25 RX ADMIN — ONDANSETRON 4 MILLIGRAM(S): 8 TABLET, FILM COATED ORAL at 04:33

## 2018-09-25 RX ADMIN — HYDROMORPHONE HYDROCHLORIDE 0.5 MILLIGRAM(S): 2 INJECTION INTRAMUSCULAR; INTRAVENOUS; SUBCUTANEOUS at 03:39

## 2018-09-25 RX ADMIN — Medication 10 MILLIGRAM(S): at 13:10

## 2018-09-25 RX ADMIN — Medication 15 MILLIGRAM(S): at 05:43

## 2018-09-25 RX ADMIN — Medication 10 MILLIGRAM(S): at 23:30

## 2018-09-25 NOTE — CONSULT NOTE ADULT - SUBJECTIVE AND OBJECTIVE BOX
HPI:  52 yo BM with HTN, Bipolar disorder, chronic knee and low back pain on disability.  Alleged DM but not on any medications for same.  Recurrent bouts of  Nausea/ vomiting for 1.5 yrs.  Alleges that prior EGD/ Colonoscopy were done at [facility] by Unknown MD about 1 yr ago showing only Gastritis.  Recently seen for first time by me in office on 2018.  Had recent Sono:  negative.  Had NM Gastric emptying study yesterday negative for Gastroparesis, yesterday.  Alleges that since the NM study he has had intractible vomiting s fever or bleeding.  Alleges diffuse abdominal pain.  No upper back pain.   No fever or jaundice.  Presented to ED and received 4 liters NS and Phenergan and Zofran, without relief.  Last vomited 1 hr ago.  Alleges unable to maintain any oral intake.  Smokes Pot daily for past 40 yrs.  Alleges that N/V is not for [employer] use.  Had sustained considerable weight loss from 300 to 175 # last yr with new onset of the diabetes.  .         PAST MEDICAL & SURGICAL HISTORY:  Gastritis  Bipolar disorder  Diabetes  Asthma  S/P knee surgery  S/P hernia surgery      ROS:  No Heartburn, regurgitation, dysphagia, odynophagia.  No dyspepsia  No abdominal pain.    No Nausea, vomiting.  No Bleeding.  No hematemesis.   No diarrhea.    No hematochesia.  No weight loss, anorexia.  No edema.      MEDICATIONS  (STANDING):  metoclopramide 5 milliGRAM(s) Oral every 6 hours  ondansetron Injectable 4 milliGRAM(s) IV Push every 6 hours  pantoprazole    Tablet 40 milliGRAM(s) Oral before breakfast    MEDICATIONS  (PRN):      Allergies    [place] (Unknown)  No Known Drug Allergies  pork (Unknown)    Intolerances        SOCIAL HISTORY:   No ETOH;  IVDU.      FAMILY HISTORY:      Vital Signs Last 24 Hrs  T(C): 36.9 (25 Sep 2018 08:23), Max: 36.9 (25 Sep 2018 05:59)  T(F): 98.5 (25 Sep 2018 08:23), Max: 98.5 (25 Sep 2018 08:23)  HR: 80 (25 Sep 2018 08:23) (80 - 98)  BP: 140/78 (25 Sep 2018 08:23) (140/78 - 168/116)  BP(mean): --  RR: 18 (25 Sep 2018 08:23) (18 - 20)  SpO2: 100% (25 Sep 2018 08:23) (100% - 100%)    PHYSICAL EXAM:    GENERAL: NAD, well-groomed, well-developed  HEAD:  Atraumatic, Normocephalic  EYES: EOMI, PERRLA, conjunctiva and sclera clear  ENMT: No tonsillar erythema, exudates, or enlargement; Moist mucous membranes, Good dentition, No lesions  NECK: Supple, No JVD, Normal thyroid  CHEST/LUNG: Clear to percussion bilaterally; No rales, rhonchi, wheezing, or rubs  HEART: Regular rate and rhythm; No murmurs, rubs, or gallops  ABDOMEN: Soft, Nontender, Nondistended; Bowel sounds present;  No HSM.  No MTR.  YRIS:  No lesions.  Liquid brown OB negative stool.    EXTREMITIES:  2+ Peripheral Pulses, No clubbing, cyanosis, or edema  LYMPH: No lymphadenopathy noted  SKIN: No rashes or lesions      LABS:                        15.1   9.9   )-----------( 264      ( 25 Sep 2018 01:50 )             44.8         142  |  106  |  12.0  ----------------------------<  152<H>  3.6   |  20.0<L>  |  0.91    Ca    9.7      25 Sep 2018 01:50    TPro  7.7  /  Alb  4.6  /  TBili  0.5  /  DBili  x   /  AST  18  /  ALT  27  /  AlkPhos  41         Urinalysis Basic - ( 25 Sep 2018 03:50 )    Color: Yellow / Appearance: Clear / S.005 / pH: x  Gluc: x / Ketone: Negative  / Bili: Negative / Urobili: Negative mg/dL   Blood: x / Protein: Negative mg/dL / Nitrite: Negative   Leuk Esterase: Negative / RBC: x / WBC x   Sq Epi: x / Non Sq Epi: x / Bacteria: x        LIVER FUNCTIONS - ( 25 Sep 2018 01:50 )  Alb: 4.6 g/dL / Pro: 7.7 g/dL / ALK PHOS: 41 U/L / ALT: 27 U/L / AST: 18 U/L / GGT: x               RADIOLOGY & ADDITIONAL STUDIES:  CT A/P negative.  No obstruction or pancreatitis.  No liver disease or ascites.  Negative colon.

## 2018-09-25 NOTE — ED ADULT NURSE NOTE - CHIEF COMPLAINT QUOTE
patient arrived in wheelchair - states that he was here for a nuclear test yesterday and has been vomiting all day today

## 2018-09-25 NOTE — ED ADULT NURSE REASSESSMENT NOTE - NS ED NURSE REASSESS COMMENT FT1
pt vomiting at this time, rectal phenergan given and dr valdes aware. pt to be admitted for irretraceable vomiting

## 2018-09-25 NOTE — ED ADULT NURSE NOTE - NSIMPLEMENTINTERV_GEN_ALL_ED
Implemented All Universal Safety Interventions:  [place] to call system. Call bell, personal items and telephone within reach. Instruct patient to call for assistance. Room bathroom lighting operational. Non-slip footwear when patient is off stretcher. Physically safe environment: no spills, clutter or unnecessary equipment. Stretcher in lowest position, wheels locked, appropriate side rails in place.

## 2018-09-25 NOTE — ED ADULT NURSE NOTE - OBJECTIVE STATEMENT
Pt c/o abdominal pain, nausea and vomiting since earlier in AM, had NM study for gastroparesis yesterday, deals w/ chronic pain regularly which leads to vomiting and dehydration, hx of HTN

## 2018-09-25 NOTE — H&P ADULT - NSHPPHYSICALEXAM_GEN_ALL_CORE
Vital Signs   T(C): 36.9 (25 Sep 2018 08:23), Max: 36.9 (25 Sep 2018 05:59)  T(F): 98.5 (25 Sep 2018 08:23), Max: 98.5 (25 Sep 2018 08:23)  HR: 80 (25 Sep 2018 08:23) (80 - 98)  BP: 140/78 (25 Sep 2018 08:23) (140/78 - 168/116)  RR: 18 (25 Sep 2018 08:23) (18 - 20)  SpO2: 100% (25 Sep 2018 08:23) (100% - 100%)  General: Well developed. Well nourished. No acute distress  HEENT: PERRLA. EOMI. Clear conjunctivae. Moist mucus membrane  Neck: Supple. No JVD. No Thyromegaly   Chest: CTA bilaterally - no wheezing, rales or rhonchi. No chest wall tenderness.  Heart: Normal S1 & S2 with RRR. No murmur.   Abdomen: Soft. ? Tender on superficial palpation. No guarding. Non-distended. + BS  Ext: No pedal edema. No calf tenderness   Neuro: AAO x 3. No focal deficit. CN II-XII grossly WNL.  No speech disorder.  Skin: Warm and Dry  Psychiatry: Normal mood and affect

## 2018-09-25 NOTE — ED ADULT NURSE NOTE - NS ED NURSE RECORD ANOTHER HT AND WT
Reason for Visit: Medicare Wellness Visit, subsequent.    HPI: Nestor Garcia is a 78 year old male who comes to the clinic today for annual Medicare wellness visit. General health over the past year has been fair. Patient has incurred no falls and has had no major illnesses. He was hospitalized on March 13, 2018 for cystolitholapaxy. He was readmitted on March 16, 2018 for fever, chills and weakness secondary to UTI. He was also noted to have atrial fib with RVR. No cognitive changes.      In terms of his type 2 diabetes, he is on Levemir 60 units at bedtime. He is not using his NovoLog at mealtime. He states that he has been working on decreasing his portion size. He continues to struggle with eating after supper. He also admits that he is not exercising. He is not checking his blood sugars at home. Last eye exam was June 2018. He did not have pre-clinic lab drawn.     In terms of his hypertension the patient is presently on metoprolol  mg one by mouth twice a day, Lisinopril 10 mg 1 tablet daily and diltiazem  mg daily. He does not check his blood pressures outside the clinic. He denies any chest pain shortness of breath or edema.     While the patient was hospitalized in March, he was noted to have atrial fib with RVR.  At that time, his metoprolol was increased to 100 mg twice daily. He denies any shortness of breath or palpitations.    The patient is presently on Lipitor 20 mg two tablets at bedtime for his hyperlipidemia. He is also on fish oil 1000 mg 2 capsules daily. He denies any muscle or joint pain swelling or weakness. He did not have pre-clinic lab drawn.    The patient is on omeprazole 20 mg one daily for his GERD. He states that that he will have a recurrence of his heartburn if he tries to decrease his omeprazole further.     The patient has benign prostatic hypertrophy for which he had a TURP in 2010. He denies any problems with urinary hesitancy nocturia or urgency. He has been called  several times for follow-up but has not made this appointment. His PSA has been elevated. At his visit 2 years ago, Dr. Lazcano felt his PSA was acceptable given the context of his age.  He did have cystolitholapaxy for a bladder stone in March. He denies any dysuria, frequency, urgency, nocturia or hesitancy.    The patient is on warfarin for his history of extensive left leg DVT in October of 2012.     The patient has previously complained of diarrhea that has been going on for several months. He stated that it was so severe that he was unable to even leave the house in the morning. He was evaluated by GI and started on Colestid 2 g daily. He states that his diarrhea is now recurring. It again is bothering him only in the morning hours.  He would like to restart his Colestid.    The patient complains of right low back pain that has been going on for years. He states his pain is becoming worse. He states that he has pain \"24/7.\" He states his pain is worse when he walks even short distances. This has interfered with his ability to do his work around his home he denies any radiation of the pain. He denies any bowel or bladder dysfunction.    While he was hospitalized in March, he had an echocardiogram which showed:  1. Mild LV dilatation. Normal wall thickness. Normal LV systolic function. LVEF 55%. No regional wall motion abnormalities.  2. Grade 3 diastolic dysfunction  3. Normal RV size and systolic function.  4. Severe mitral regurgitation  5. Mild aortic stenosis with mean gradient of 12.4 mmHg, peak velocity 2.1 m/s  He does admit to shortness of breath with exertion. He also states that he will not have surgery to repair his valve.    I have reviewed the patient's medications and allergies, past medical, surgical, social and family history, updating these as appropriate.  See Histories section of the EMR for a display of this information.          Past Medical History:   Diagnosis Date   • Abdominal aortic  aneurysm (AAA) (CMS/[facility])    • BPH (benign prostatic hypertrophy)    • Cataract    • Coronary artery disease    • Diverticulosis    • Essential (primary) hypertension    • GERD (gastroesophageal reflux disease)    • Hyperlipemia    • Kidney stone 07/01/2012 and 12/16   • Left leg DVT (CMS/HCC) 10/01/2012    Chronic Warfarin therapy   • Neck pain    • Osteoarthritis    • Renal failure    • Type II or unspecified type diabetes mellitus without mention of complication, not stated as uncontrolled    • Vitreous floaters        Past Surgical History:   Procedure Laterality Date   • Colonoscopy  8/25/06   • Colonoscopy diagnostic  04/25/2013    Due April 2018   • Colonoscopy remove lesion by snare  08/22/2008   • Colorec canc scrn,fecal occult blood  04/11/2013   • Coronary angioplasty with stent placement  1997    Right coronary artery   • I&d perianal abscess,superficial  01/01/2011   • Laminectomy  02/07/2007   • Removal of tonsils,<11 y/o     • Total hip arthroplasty Right 9/27/16   • Transurethral resection of prostate  02/02/2010   • Ureteral stent placement  07/31/2012   • Ureteral stent placement  07/22/2013       Current Outpatient Prescriptions   Medication Sig   • colestipol (COLESTID) 1 g Tab Take 2 tablets by mouth daily.   • metoPROLOL succinate (TOPROL-XL) 100 MG 24 hr tablet Take 1 tablet by mouth every 12 hours.   • dilTIAZem (MATZIM LA) 240 MG 24 hr tablet Take 1 tablet by mouth daily.   • omeprazole (PRILOSEC) 20 MG capsule TAKE ONE CAPSULE BY MOUTH EVERY DAY   • metformin (GLUCOPHAGE) 1000 MG tablet Take 1 tablet by mouth 2 times daily (with meals).   • lisinopril (ZESTRIL) 10 MG tablet Take 2 tablets by mouth daily.   • Omega 3 1200 MG Cap Take 2,400 mg by mouth daily.   • Ferrous Sulfate (IRON) 28 MG Tab Take 1 tablet by mouth daily.   • insulin detemir (LEVEMIR FLEXTOUCH) 100 UNIT/ML pen-injector Inject 40 Units into the skin nightly.   • atorvastatin (LIPITOR) 40 MG tablet TAKE ONE TABLET BY MOUTH  EVERY DAY   • amoxicillin (AMOXIL) 500 MG capsule Take 4 capsules one hour prior to dental work   • furosemide (LASIX) 20 MG tablet Take 1 tablet by mouth daily.   • warfarin (COUMADIN) 5 MG tablet Take 1 tablet by mouth daily.   • fenofibrate (TRICOR) 54 MG tablet TAKE ONE TABLET BY MOUTH EVERY DAY     No current facility-administered medications for this visit.        Allergies as of 06/18/2018   • (No Known Allergies)       Other Medical Providers:   Dr Vika Donovan -  Optometry.  Dr. Hernández - Dentist.  Dr. Robert Lazcano - Urology.  Dr. Zen Kaplan - Orthopedics.      Social History     Social History   • Marital status:      Spouse name: N/A   • Number of children: N/A   • Years of education: N/A     Social History Main Topics   • Smoking status: Former Smoker     Packs/day: 15.00     Years: 11.00     Start date: 1/1/1956     Quit date: 1/1/1965   • Smokeless tobacco: Never Used   • Alcohol use No   • Drug use: No   • Sexual activity: Not Asked     Other Topics Concern   • None     Social History Narrative   • None       Family History   Problem Relation Age of Onset   • COPD Mother    • Heart disease Mother    • Depression Mother    • Heart disease Father          Review of Systems Health Update:     GENERAL / CONSTITUTIONAL:  []  YES    [x]  NO   Excessive fatigue.  []  YES    [x]  NO   Unexplained weight loss or gain.  []  YES    [x]  NO   Excessive sweating or night sweats.    EYES:  []  YES    [x]  NO   Blurry or double vision.  []  YES    [x]  NO   Eye pain.   []  YES    [x]  NO   Other visual disturbance.    EARS, NOSE, MOUTH AND THROAT:  []  YES    [x]  NO   Loss of hearing or prolonged roaring/ringing in ears.   []  YES    [x]  NO   Nasal obstruction or discharge.  []  YES    [x]  NO   Hoarseness or voice changes.  []  YES    [x]  NO   Difficult or painful swallowing.    CARDIOVASCULAR:  []  YES    [x]  NO   Chest pain/discomfort at rest or with exercise.  []  YES    [x]  NO   Heart murmur,  palpitations, or irregular heart beat.  []  YES    [x]  NO   History of heart attack or other heart trouble.  []  YES    [x]  NO   Leg cramps with walking.  []  YES    [x]  NO   Ankle swelling.   [x]  YES    []  NO   Shortness of breath with exertion.   [x]  YES    []  NO   History of high blood pressure.    LUNGS:   []  YES    [x]  NO   Coughing up blood  []  YES    [x]  NO   Chronic cough.  []  YES    [x]  NO   Abnormal chest x-ray.  []  YES    [x]  NO   Wheezing.  []  YES    [x]  NO   History of positive TB skin test.     IMMUNE SYSTEM/ALLERGIES:  []  YES    [x]  NO   Frequent infections.   []  YES    [x]  NO   History of asthma/allergies.  []  YES    [x]  NO   Frequent nasal congestion.   []  YES    [x]  NO   Itchy or watery eyes.   []  YES    [x]  NO   History of blood transfusion.     INTESTINAL:  []  YES    [x]  NO   Poor appetite.  []  YES    [x]  NO   Frequent indigestion or heartburn.  []  YES    [x]  NO   Nausea, vomiting, diarrhea, or constipation.  []  YES    [x]  NO   Vomiting blood.  []  YES    [x]  NO   Rectal bleeding or black tarry stools.  []  YES    [x]  NO   Diagnosis of hepatitis.      ENDOCRINE:  []  YES    [x]  NO   Heat or cold intolerance.  []  YES    [x]  NO   Excessive thirst or urination.  [x]  YES    []  NO   History of diabetes or thyroid disease.  Diabetes.  HEMATOLOGIC:   []  YES    [x]  NO   Swollen glands or lymph nodes.  []  YES    [x]  NO   History of anemia.   []  YES    [x]  NO   Bruise easily.   []  YES    [x]  NO   Bleeding tendencies.  [x]  YES    []  NO   History of blood clots. On warfarin.    MUSCULOSKELETAL:  [x]  YES    []  NO   Swollen, stiff, or painful joints.  (Generalized).  []  YES    [x]  NO   Neck pain.   []  YES    [x]  NO   Back pain.   []  YES    [x]  NO   History of gout.     SKIN:  []  YES    [x]  NO   Recurrent skin rash.   []  YES    [x]  NO   Mole changes in size or color.  []  YES    [x]  NO   Abnormal hair growth or loss.    NEUROLOGIC:   []  YES     [x]  NO   Frequent or severe headaches.  [x]  YES    []  NO   Loss of balance. He has not incurred any falls but does admit that his balance is not as good as it was when he was younger.   []  YES    [x]  NO   Unexplained dizziness.  []  YES    [x]  NO   Loss of consciousness.   []  YES    [x]  NO   History of head injury.  []  YES    [x]  NO   Weakness or recurrent numbness or tingling in hands or feet.  []  YES    [x]  NO   Twitching or tremors.   []  YES    [x]  NO   Difficulty with speech.     UROLOGIC:   []  YES    [x]  NO   Recurrent bladder or kidney infections.   [x]  YES    []  NO   Kidney stones. Most recent in December 2016. Bladder stone with cystolitholoplaxy in March 2018.  []  YES    [x]  NO   Chronic bladder pain, incontinence, difficulty urinating, or urinary frequency.    HEALTH MAINTENANCE: Reviewed with patient.   Colonoscopy-April 2013. This is due for repeat in April 2018.  PSA-April 2018.  Pneumonia vaccine-January 2013.  Prevnar 13-February 2016.  Tetanus diphtheria-July 2017.  Zoster vaccine in January 2014.  Influenza vaccine-October 2017.  Last cholesterol-April 2018.  Last blood glucose-April 2018.  Last eye exam-He does have an upcoming appointment scheduled on June 25, 2018.  He is at low risk for hepatitis B.        PHYSICAL EXAM:  GENERAL:  Alert, oriented, well developed, well nourished 78 year old year-old male in no apparent distress.  Visit Vitals  /64   Pulse 68   Ht 5' 8\" (1.727 m)   Wt 85.2 kg   BMI 28.55 kg/m²     SKIN:  Warm, dry, well hydrated.  No rashes or jaundice. He does have numerous seborrheic keratoses to his upper back.  HEENT:  Head:  Normocephalic,  Eyes:  EOM intact,  Sclerae white, conjunctivae clear.  PERRLA.  Ears:  No tenderness or masses to external ear.  Canals clear.  TMs pearly grey with good light reflex.  Nose:  Mucosa pink.  Mouth:  Mucosa and gingival pink and moist.    Tongue protrudes in midline.  Throat:  Mucosa pink.  No swelling or  exudates.  Uvula rises in midline on phonation.  Gag reflex present.  NECK:  Supple with full range of motion.  No anterior cervical, posterior cervical or supraclavicular lymphadenopathy, masses or tenderness.  No thyroid enlargement, masses or tenderness.  RESPIRATORY:  Clear to auscultation.  CARDIAC:  Regular rate and rhythm.  No murmur.  ABDOMEN:  Bowel sounds present, in all four quadrants.  Soft to palpation, no hepatomegaly,  or tenderness.  Diastasis recti present. Femoral pulses 2+ and equal.  RECTAL: Deferred.  MUSCULOSKELETAL:  Normal gait.  No joint swelling.  EXTREMITIES:  No clubbing, cyanosis or edema.  Radial/posterior tibial and dorsalis pedis pulses present and equal bilaterally. Sensation to both feet is intact to nylon filament.   Diabetic Foot Exam Documentation   Normal Bilateral Foot Exam: Skin integrity is normal. Dorsalis pedis and posterior tibial pulses are present.  Pressure sensation using the Germfask-Jeffry monofilament is present.        ASSESSMENT:   1. Physical exam as described above.  2. Type 2 diabetes.   3. Hypertension, not well-controlled.   4. Hyperlipidemia.   5. GERD, stable.   6. Benign prostatic hypertrophy with elevated PSA.   7. Infrarenal abdominal aortic aneurysm.  8. Atrial fibrillation with RVR.  9. Chronic low back pain.    PLAN   1. He will continue Levemir 60 units at bedtime. He will continue metformin 1000 mg twice a day.    2. He will continue metoprolol 100 mg one by mouth twice a day as well as lisinopril 20 mg daily and diltiazem  mg daily.  3. He will continue omeprazole 20 mg one daily.   4. He will continue Lipitor 40 mg one at at bedtime. He will continue his fish oil  2000 mg twice a day.   5. He states he will make more of an effort to work on his diet and exercise. We again discussed cutting out the late night snacking as well as 30 minutes of aerobic exercise per day.   6. He is referred to Dr. Malloy for colonoscopy.  7. He refuses to  follow-up with cardiology.  8. He is referred to optometry for diabetic eye exam.  9. He will be scheduled for a repeat abdominal ultrasound in September.  10. He is sent for x-rays of his lumbar spine.  11. He will return at his convenience when he is fasting for a CMP, glycohemoglobin, CBC and fasting lipid panel.              Orders Placed This Encounter   • XR Lumbar Spine 2 or 3 Views   • SERVICE TO SURGERY COLORECTAL   • SERVICE TO OPTOMETRY   • colestipol (COLESTID) 1 g Tab     Return in about 6 months (around 12/18/2018) for Recheck meds.  Patient Instructions   Please schedule your diabetic eye exam with your eye doctor.     Next Medicare wellness visit in 1 year.  Follow up chronic disease management due in 6 mos with labs prior to visit.                Yes

## 2018-09-25 NOTE — H&P ADULT - PMH
Asthma    Bipolar disorder    Chronic back pain    Chronic pain of left knee    Diabetes    Gastritis    HLD (hyperlipidemia)    HTN (hypertension)

## 2018-09-25 NOTE — ED CDU PROVIDER DISPOSITION NOTE - CLINICAL COURSE
pt with h/o ? gastroparesis; evaluated by Dr Mathias of  GI pt to be admitted for iv fluids and meds;

## 2018-09-25 NOTE — H&P ADULT - ASSESSMENT
Incomplete 53 years old male with PMH of Marijuana Use, DM (Diet controlled), HTN, HLD (Diet Controlled), Asthma and Bipolar Disorder comes with vomiting. As per patient, he has chronic abdominal pain and gets intermittent vomiting for 18 months. He has been seen by GI and had extensive work up and so far no abnormality has been found. He had NM Gastric Emptying Study yesterday and it ruled out Gastroparesis.   As per patient, yesterday after the test, he went home and started having vomiting (non bilious and non bloody) so he came to the hospital. Denies fever, diarrhea or eating anything different. Denies relation with Marijuana. No relief with hot shower.   Had EGD and Colonoscopy at [facility] three years ago and it was normal as per patient.     1) Intractable Nausea and Vomiting  - GI Consult appreciated  - Zofran and Reglan every 6 hours  - Protonix 40 mg IVP daily  - IVF  - Tox Screen  - Clear Liquid Diet  - NPO after midnight for EGD in am  2) Elevated Lactate  - Likely secondary to dehydration  - Continue IVF  - Repeat Lactate  3) DM  - Diet controlled  4) HTN  - Amlodipine 10 mg  5) HLD  - Diet Controlled  6) Chronic Back and Left Knee Pain  - Continue Oxycodone PRN and Tizanidine  DVT Prophylaxis -- Lovenox 40 mg 53 years old male with PMH of Marijuana Use, DM (Diet controlled), HTN, HLD (Diet Controlled), Asthma and Bipolar Disorder comes with vomiting. As per patient, he has chronic abdominal pain and gets intermittent vomiting for 18 months. He has been seen by GI and had extensive work up and so far no abnormality has been found. He had NM Gastric Emptying Study yesterday and it ruled out Gastroparesis.   As per patient, yesterday after the test, he went home and started having vomiting (non bilious and non bloody) so he came to the hospital. Denies fever, diarrhea or eating anything different. Denies relation with Marijuana. No relief with hot shower.   Had EGD and Colonoscopy at [facility] three years ago and it was normal as per patient.     1) Intractable Nausea and Vomiting  - GI Consult appreciated  - Zofran and Reglan every 6 hours  - Protonix 40 mg IVP daily  - IVF  - Tox Screen  - Clear Liquid Diet  - NPO after midnight for EGD in am  2) Elevated Lactate  - Likely secondary to dehydration  - Continue IVF  - Repeat Lactate  3) DM  - Diet controlled  4) HTN  - Amlodipine 10 mg  5) HLD  - Diet Controlled  6) Chronic Back and Left Knee Pain  - Continue Oxycodone PRN and Tizanidine  7) Bipolar Disorder  - Continue Depakote  mg  DVT Prophylaxis -- Lovenox 40 mg 53 years old male with PMH of Marijuana Use, DM (Diet controlled), HTN, HLD (Diet Controlled), Asthma and Bipolar Disorder comes with vomiting. As per patient, he has chronic abdominal pain and gets intermittent nausea/vomiting for 18 months. He has been seen by GI and had extensive work up and so far no abnormality has been found. He had NM Gastric Emptying Study yesterday and it ruled out Gastroparesis.   Yesterday after the test, he went home and started having nausea and vomiting (non bilious and non bloody) so he came to the hospital. Denies fever, diarrhea or eating anything different. Denies relation with Marijuana. No relief with hot shower.   Had EGD and Colonoscopy at [facility] three years ago and it was normal as per patient.     1) Intractable Nausea and Vomiting  - GI Consult appreciated  - Zofran and Reglan every 6 hours  - Protonix 40 mg IVP daily  - IVF  - Tox Screen  - Clear Liquid Diet  - NPO after midnight for EGD in am  2) Elevated Lactate  - Likely secondary to dehydration  - Continue IVF  - Repeat Lactate  3) DM  - Diet controlled  4) HTN  - Amlodipine 10 mg  5) HLD  - Diet Controlled  6) Chronic Back and Left Knee Pain  - Continue Oxycodone PRN and Tizanidine  7) Bipolar Disorder  - Continue Depakote  mg  DVT Prophylaxis -- Lovenox 40 mg 53 years old male with PMH of Marijuana Use, DM (Diet controlled), HTN, HLD (Diet Controlled), Asthma and Bipolar Disorder comes with nausea and vomiting. As per patient, he has chronic abdominal pain and gets intermittent nausea/vomiting for 18 months. He has been seen by GI and had extensive work up and so far no abnormality has been found. He had NM Gastric Emptying Study yesterday and it ruled out Gastroparesis.   Yesterday after the test, he went home and started having nausea and vomiting (non bilious and non bloody) so he came to the hospital. Denies fever, diarrhea or eating anything different. Denies relation with Marijuana. No relief with hot shower.   Had EGD and Colonoscopy at [facility] three years ago and it was normal as per patient.     1) Intractable Nausea and Vomiting  - GI Consult appreciated  - Zofran and Reglan every 6 hours  - Protonix 40 mg IVP daily  - IVF  - Tox Screen  - Clear Liquid Diet  - NPO after midnight for EGD in am  2) Elevated Lactate  - Likely secondary to dehydration  - Continue IVF  - Repeat Lactate  3) DM  - Diet controlled  4) HTN  - Amlodipine 10 mg  5) HLD  - Diet Controlled  6) Chronic Back and Left Knee Pain  - Continue Oxycodone PRN and Tizanidine  7) Bipolar Disorder  - Continue Depakote  mg  DVT Prophylaxis -- Lovenox 40 mg

## 2018-09-25 NOTE — ED CDU PROVIDER INITIAL DAY NOTE - PROGRESS NOTE DETAILS
Labs and CT are as noted. Lactate secondary to patient's vomiting with associated dry heaving. Patient with improved pain but continues to have nausea and dry heaving. Will continue to treat.

## 2018-09-25 NOTE — ED CDU PROVIDER INITIAL DAY NOTE - MEDICAL DECISION MAKING DETAILS
Pt with a hx of gastroparesis presenting with worsening abd pain. Will evaluate acute pain. Plan to obtain blood work, labs, CT scan, medicate and re-eval.

## 2018-09-25 NOTE — H&P ADULT - NSHPSOCIALHISTORY_GEN_ALL_CORE
Former smoker - quit 17 years ago.  Drinks alcohol socially.  Smokes Marijuana daily for > 40 years.

## 2018-09-25 NOTE — ED PROVIDER NOTE - OBJECTIVE STATEMENT
52 y/o male with a hx of Asthma, Bipolar disorder, DM, and gastritis presents to the ED c/o abd pain that onset months ago. According to pt, he has been dealing with chronic abd pain and was diagnosed with gastroparesis. He had a nuclear study done for the abd yesterday. Denies N/D, fever, chills, SOB, CP, difficulty breathing, HA, diaphoresis, leg swelling, or blurry vision. No further complaints at this time.

## 2018-09-25 NOTE — ED PROVIDER NOTE - PROGRESS NOTE DETAILS
reviewed nuclear scan. no indication of gastroparesis there,. Labs and CT are as noted. Lactate secondary to patient's vomiting with associated dry heaving. Patient with improved pain but continues to have nausea and dry heaving. Will continue to treat. Patient continues to have nausea and vomiting.

## 2018-09-25 NOTE — H&P ADULT - NSHPLABSRESULTS_GEN_ALL_CORE
LABS:                        15.1   9.9   )-----------( 264      ( 25 Sep 2018 01:50 )             44.8     09-25    142  |  106  |  12.0  ----------------------------<  152<H>  3.6   |  20.0<L>  |  0.91    Ca    9.7      25 Sep 2018 01:50    TPro  7.7  /  Alb  4.6  /  TBili  0.5  /  DBili  x   /  AST  18  /  ALT  27  /  AlkPhos  41  09-25      CARDIAC MARKERS ( 25 Sep 2018 01:50 )  x     / x     / 188 U/L / x     / 1.8 ng/mL      CT Abdomen and Pelvis w/ IV Cont (09.25.18 @ 02:45)  No acute findings on CT the abdomen and pelvis to explain patient's abdominal pain.    US Abdomen Complete (09.24.18 @ 11:27)   Unremarkable abdominal ultrasound.    NM Gastric Emptying Solid (09.24.18 @ 12:28)   Normal gastric emptying study. No radionuclide evidence of gastroparesis.

## 2018-09-25 NOTE — H&P ADULT - HISTORY OF PRESENT ILLNESS
53 years old male with PMH of Marijuana Use, DM (Diet controlled), HTN, HLD (Diet Controlled), Asthma and Bipolar Disorder comes with vomiting. As per patient, he has chronic abdominal pain and gets intermittent vomiting for 18 months. He has been seen by GI and had extensive work up and so far no abnormality has been found. He had NM Gastric Emptying Study yesterday and it ruled out Gastroparesis.   As per patient, yesterday after the test, he went home and started having vomiting (non bilious and non bloody) so he came to the hospital. Denies fever, diarrhea or eating anything different. Denies relation with Marijuana. No relief with hot shower.   Had EGD and Colonoscopy at [facility] three years ago and it was normal as per patient. 53 years old male with PMH of Marijuana Use, DM (Diet controlled), HTN, HLD (Diet Controlled), Asthma and Bipolar Disorder comes with vomiting. As per patient, he has chronic abdominal pain and gets intermittent nausea/vomiting for 18 months. He has been seen by GI and had extensive work up and so far no abnormality has been found. He had NM Gastric Emptying Study yesterday and it ruled out Gastroparesis.   Yesterday after the test, he went home and started having nausea and vomiting (non bilious and non bloody) so he came to the hospital. Denies fever, diarrhea or eating anything different. Denies relation with Marijuana. No relief with hot shower.   Had EGD and Colonoscopy at [facility] three years ago and it was normal as per patient. 53 years old male with PMH of Marijuana Use, DM (Diet controlled), HTN, HLD (Diet Controlled), Asthma and Bipolar Disorder comes with nausea and vomiting. As per patient, he has chronic abdominal pain and gets intermittent nausea/vomiting for 18 months. He has been seen by GI and had extensive work up and so far no abnormality has been found. He had NM Gastric Emptying Study yesterday and it ruled out Gastroparesis.   Yesterday after the test, he went home and started having nausea and vomiting (non bilious and non bloody) so he came to the hospital. Denies fever, diarrhea or eating anything different. Denies relation with Marijuana. No relief with hot shower.   Had EGD and Colonoscopy at [facility] three years ago and it was normal as per patient.

## 2018-09-25 NOTE — ED PROVIDER NOTE - MEDICAL DECISION MAKING DETAILS
Pt with a hx of gastroperesis presenting with worsening abd pain. Will evaluate acute pain. Plan to obtain blood work, labs, CT scan, medicate and re-eval. Pt with a hx of gastroparesis presenting with worsening abd pain. Will evaluate acute pain. Plan to obtain blood work, labs, CT scan, medicate and re-eval.

## 2018-09-25 NOTE — CONSULT NOTE ADULT - ASSESSMENT
Intractible N/V;  No response to Hydration and IV medications.  Negative Sono, CT and NM gastric emptying studies all recently done.  PE unremarkable.  YRIS negative.  BW all OK.  No obvious pancreatitis.  Not DKA.  Possible GI motility disorder.  Possible Cannabis induced hyperemesis.  No acute pathology on imaging studies.  Suggest:  Admission for IV hydration.  ATC Reglan and Zofran.  Clears for now.  NPO p MN for EGD tomorrow.   PRBP explained agrees.  ASA #2E.  Get old records from [facility],  re: EGD/ path and colonoscopy 1 yr ago.     IV PPI for now.

## 2018-09-25 NOTE — ED CDU PROVIDER INITIAL DAY NOTE - DETAILS
52 yo male with hx of recurrent episodes of abdominal pain with persistent nausea and vomiting/dry heaving despite a couple of doses of antiemetics. Patient placed in obs pending serial abdominal examinations and further antiemetic doses. GI consult also requested given symptoms and no indication of acute surgical problem.

## 2018-09-25 NOTE — H&P ADULT - FAMILY HISTORY
Mother  Still living? Unknown  Family history of throat cancer, Age at diagnosis: Age Unknown     Father  Still living? Unknown  Family history of stroke, Age at diagnosis: Age Unknown

## 2018-09-26 ENCOUNTER — TRANSCRIPTION ENCOUNTER (OUTPATIENT)
Age: 53
End: 2018-09-26

## 2018-09-26 ENCOUNTER — RESULT REVIEW (OUTPATIENT)
Age: 53
End: 2018-09-26

## 2018-09-26 VITALS
RESPIRATION RATE: 20 BRPM | HEART RATE: 79 BPM | SYSTOLIC BLOOD PRESSURE: 172 MMHG | DIASTOLIC BLOOD PRESSURE: 104 MMHG | TEMPERATURE: 98 F

## 2018-09-26 LAB
ANION GAP SERPL CALC-SCNC: 12 MMOL/L — SIGNIFICANT CHANGE UP (ref 5–17)
BASOPHILS # BLD AUTO: 0 K/UL — SIGNIFICANT CHANGE UP (ref 0–0.2)
BASOPHILS NFR BLD AUTO: 0.5 % — SIGNIFICANT CHANGE UP (ref 0–2)
BUN SERPL-MCNC: 5 MG/DL — LOW (ref 8–20)
CALCIUM SERPL-MCNC: 9.2 MG/DL — SIGNIFICANT CHANGE UP (ref 8.6–10.2)
CHLORIDE SERPL-SCNC: 107 MMOL/L — SIGNIFICANT CHANGE UP (ref 98–107)
CO2 SERPL-SCNC: 24 MMOL/L — SIGNIFICANT CHANGE UP (ref 22–29)
CREAT SERPL-MCNC: 0.9 MG/DL — SIGNIFICANT CHANGE UP (ref 0.5–1.3)
EOSINOPHIL # BLD AUTO: 0 K/UL — SIGNIFICANT CHANGE UP (ref 0–0.5)
EOSINOPHIL NFR BLD AUTO: 0.1 % — SIGNIFICANT CHANGE UP (ref 0–5)
GLUCOSE BLDC GLUCOMTR-MCNC: 91 MG/DL — SIGNIFICANT CHANGE UP (ref 70–99)
GLUCOSE BLDC GLUCOMTR-MCNC: 99 MG/DL — SIGNIFICANT CHANGE UP (ref 70–99)
GLUCOSE SERPL-MCNC: 100 MG/DL — SIGNIFICANT CHANGE UP (ref 70–115)
HCT VFR BLD CALC: 45.4 % — SIGNIFICANT CHANGE UP (ref 42–52)
HGB BLD-MCNC: 15.1 G/DL — SIGNIFICANT CHANGE UP (ref 14–18)
LYMPHOCYTES # BLD AUTO: 2.8 K/UL — SIGNIFICANT CHANGE UP (ref 1–4.8)
LYMPHOCYTES # BLD AUTO: 37 % — SIGNIFICANT CHANGE UP (ref 20–55)
MAGNESIUM SERPL-MCNC: 2.1 MG/DL — SIGNIFICANT CHANGE UP (ref 1.6–2.6)
MCHC RBC-ENTMCNC: 30.8 PG — SIGNIFICANT CHANGE UP (ref 27–31)
MCHC RBC-ENTMCNC: 33.3 G/DL — SIGNIFICANT CHANGE UP (ref 32–36)
MCV RBC AUTO: 92.5 FL — SIGNIFICANT CHANGE UP (ref 80–94)
MONOCYTES # BLD AUTO: 0.9 K/UL — HIGH (ref 0–0.8)
MONOCYTES NFR BLD AUTO: 11.7 % — HIGH (ref 3–10)
NEUTROPHILS # BLD AUTO: 3.8 K/UL — SIGNIFICANT CHANGE UP (ref 1.8–8)
NEUTROPHILS NFR BLD AUTO: 50.6 % — SIGNIFICANT CHANGE UP (ref 37–73)
PLATELET # BLD AUTO: 245 K/UL — SIGNIFICANT CHANGE UP (ref 150–400)
POTASSIUM SERPL-MCNC: 3.6 MMOL/L — SIGNIFICANT CHANGE UP (ref 3.5–5.3)
POTASSIUM SERPL-SCNC: 3.6 MMOL/L — SIGNIFICANT CHANGE UP (ref 3.5–5.3)
RBC # BLD: 4.91 M/UL — SIGNIFICANT CHANGE UP (ref 4.6–6.2)
RBC # FLD: 13.3 % — SIGNIFICANT CHANGE UP (ref 11–15.6)
SODIUM SERPL-SCNC: 143 MMOL/L — SIGNIFICANT CHANGE UP (ref 135–145)
WBC # BLD: 7.5 K/UL — SIGNIFICANT CHANGE UP (ref 4.8–10.8)
WBC # FLD AUTO: 7.5 K/UL — SIGNIFICANT CHANGE UP (ref 4.8–10.8)

## 2018-09-26 PROCEDURE — 88342 IMHCHEM/IMCYTCHM 1ST ANTB: CPT | Mod: 26

## 2018-09-26 PROCEDURE — 43239 EGD BIOPSY SINGLE/MULTIPLE: CPT

## 2018-09-26 PROCEDURE — 99239 HOSP IP/OBS DSCHRG MGMT >30: CPT

## 2018-09-26 PROCEDURE — 88305 TISSUE EXAM BY PATHOLOGIST: CPT | Mod: 26

## 2018-09-26 RX ORDER — PANTOPRAZOLE SODIUM 20 MG/1
1 TABLET, DELAYED RELEASE ORAL
Qty: 60 | Refills: 0
Start: 2018-09-26 | End: 2018-11-24

## 2018-09-26 RX ORDER — ONDANSETRON 8 MG/1
1 TABLET, FILM COATED ORAL
Qty: 42 | Refills: 0
Start: 2018-09-26 | End: 2018-10-02

## 2018-09-26 RX ADMIN — Medication 10 MILLIGRAM(S): at 12:43

## 2018-09-26 RX ADMIN — AMLODIPINE BESYLATE 5 MILLIGRAM(S): 2.5 TABLET ORAL at 05:36

## 2018-09-26 RX ADMIN — ONDANSETRON 4 MILLIGRAM(S): 8 TABLET, FILM COATED ORAL at 12:42

## 2018-09-26 RX ADMIN — Medication 10 MILLIGRAM(S): at 05:36

## 2018-09-26 RX ADMIN — PANTOPRAZOLE SODIUM 40 MILLIGRAM(S): 20 TABLET, DELAYED RELEASE ORAL at 12:42

## 2018-09-26 RX ADMIN — ONDANSETRON 4 MILLIGRAM(S): 8 TABLET, FILM COATED ORAL at 04:26

## 2018-09-26 NOTE — BRIEF OPERATIVE NOTE - OPERATION/FINDINGS
Patient had mildly irregular Z line. Biopsy take to r/o barretts. The patient had mild erythema in the antrum. Biopsies take of antrum and body to r/o HP.

## 2018-09-26 NOTE — DISCHARGE NOTE ADULT - PLAN OF CARE
stop using marijuana Your symptoms are due to continued use of cannabis. Cease using all cannabis related products to have resolution of symptoms continue with home medications for pain continue home medications start taking protonix for gastritis; trial a bland diet at home.

## 2018-09-26 NOTE — DISCHARGE NOTE ADULT - SECONDARY DIAGNOSIS.
Chronic low back pain, unspecified back pain laterality, with sciatica presence unspecified Essential hypertension Acute gastritis without hemorrhage, unspecified gastritis type

## 2018-09-26 NOTE — DISCHARGE NOTE ADULT - PATIENT PORTAL LINK FT
You can access the [facility] Portal, offered by [facility], by registering with the following website: [email or web address]

## 2018-09-26 NOTE — DISCHARGE NOTE ADULT - CARE PLAN
Principal Discharge DX:	Cannabis hyperemesis syndrome concurrent with and due to cannabis abuse  Goal:	stop using marijuana  Assessment and plan of treatment:	Your symptoms are due to continued use of cannabis. Cease using all cannabis related products to have resolution of symptoms  Secondary Diagnosis:	Chronic low back pain, unspecified back pain laterality, with sciatica presence unspecified  Assessment and plan of treatment:	continue with home medications for pain  Secondary Diagnosis:	Essential hypertension  Assessment and plan of treatment:	continue home medications  Secondary Diagnosis:	Acute gastritis without hemorrhage, unspecified gastritis type  Assessment and plan of treatment:	start taking protonix for gastritis; trial a bland diet at home.

## 2018-09-26 NOTE — PROGRESS NOTE ADULT - SUBJECTIVE AND OBJECTIVE BOX
Patient is a 53y old  Male who presents with a chief complaint of n/v (25 Sep 2018 23:45)      PAST MEDICAL HISTORY:  Chronic pain of left knee  Chronic back pain  HLD (hyperlipidemia)  HTN (hypertension)  Gastritis  Bipolar disorder  Diabetes  Asthma      PAST SURGICAL HISTORY:  H/O colonoscopy  H/O endoscopy  S/P knee surgery  S/P hernia surgery      MEDICATIONS  (STANDING):  amLODIPine   Tablet 5 milliGRAM(s) Oral daily  dextrose 5%. 1000 milliLiter(s) (50 mL/Hr) IV Continuous <Continuous>  dextrose 50% Injectable 12.5 Gram(s) IV Push once  dextrose 50% Injectable 25 Gram(s) IV Push once  dextrose 50% Injectable 25 Gram(s) IV Push once  diVALproex  milliGRAM(s) Oral at bedtime  enoxaparin Injectable 40 milliGRAM(s) SubCutaneous daily  influenza   Vaccine 0.5 milliLiter(s) IntraMuscular once  insulin lispro (HumaLOG) corrective regimen sliding scale   SubCutaneous Before meals and at bedtime  lactated ringers. 1000 milliLiter(s) (100 mL/Hr) IV Continuous <Continuous>  metoclopramide Injectable 10 milliGRAM(s) IV Push every 6 hours  ondansetron Injectable 4 milliGRAM(s) IV Push every 6 hours  pantoprazole  Injectable 40 milliGRAM(s) IV Push daily  tiZANidine 4 milliGRAM(s) Oral at bedtime    MEDICATIONS  (PRN):  acetaminophen   Tablet .. 650 milliGRAM(s) Oral every 6 hours PRN Mild Pain (1 - 3)  dextrose 40% Gel 15 Gram(s) Oral once PRN Blood Glucose LESS THAN 70 milliGRAM(s)/deciLiter  glucagon  Injectable 1 milliGRAM(s) IntraMuscular once PRN Glucose <70 milliGRAM(s)/deciLiter  hydrALAZINE Injectable 10 milliGRAM(s) IV Push every 6 hours PRN If SBP > 160  oxyCODONE    IR 5 milliGRAM(s) Oral every 6 hours PRN Moderate Pain (4 - 6)  oxyCODONE    IR 10 milliGRAM(s) Oral every 6 hours PRN Severe Pain (7 - 10)      Allergies:    No Known drug allergies.                   Reynolds (Unknown)                   Pork (Unknown)      SOCIAL HISTORY:     The patient says that he has an occasional beer.  He quit smoking 17 years                                 ago after smoking 1 ppd x 30 years.  He never uses illicit drugs.                          15.1   9.9   )-----------( 264      ( 25 Sep 2018 01:50 )             44.8     09-25    142  |  106  |  12.0  ----------------------------<  152<H>  3.6   |  20.0<L>  |  0.91    Ca    9.7      25 Sep 2018 01:50    TPro  7.7  /  Alb  4.6  /  TBili  0.5  /  DBili  x   /  AST  18  /  ALT  27  /  AlkPhos  41  09-25    EKG:  -  6/13/2015  Normal sinus rhythm  Septal infarct , age undetermined  Abnormal ECG    TT ECHO:  None    US ABDOMEN COMPLETE -  9/24/2018  IMPRESSION:   Unremarkable abdominal ultrasound.    CT Abdomen & Pelvis  9/25/2018  IMPRESSION:  No acute findings on CT the abdomen and pelvis to explain patient's   abdominal pain.    ASA # = 3  Mallampati # = 2

## 2018-09-26 NOTE — DISCHARGE NOTE ADULT - CARE PROVIDER_API CALL
Kenn Mathias), Gastroenterology; Internal Medicine  [address] [place] [address]  Phone: [phone]  Fax: [phone]

## 2018-09-26 NOTE — DISCHARGE NOTE ADULT - HOSPITAL COURSE
53 years old male with PMH of Marijuana Use, DM (Diet controlled), HTN, HLD (Diet Controlled), Asthma and Bipolar Disorder comes with nausea and vomiting. As per patient, he has chronic abdominal pain and gets intermittent nausea/vomiting for 18 months. He has been seen by GI and had extensive work up and so far no abnormality has been found. He had NM Gastric Emptying Study yesterday and it ruled out Gastroparesis. Yesterday after the test, he went home and started having nausea and vomiting (non bilious and non bloody) so he came to the hospital. Denies fever, diarrhea or eating anything different. Denies relation with Marijuana. No relief with hot shower.    Patient underwent EGD today that showed gastritis. After procedure patient feeling better and wants to go home. GI has no other recommendations or reservations on patient being discharged today.    ICU Vital Signs Last 24 Hrs  T(C): 36.7 (26 Sep 2018 10:29), Max: 37.6 (25 Sep 2018 20:25)  T(F): 98 (26 Sep 2018 10:29), Max: 99.6 (25 Sep 2018 20:25)  HR: 79 (26 Sep 2018 10:29) (63 - 81)  BP: 172/104 (26 Sep 2018 10:29) (115/72 - 172/104)  BP(mean): --  ABP: --  ABP(mean): --  RR: 20 (26 Sep 2018 10:29) (18 - 20)  SpO2: 96% (26 Sep 2018 04:03) (96% - 97%)    PHYSICAL EXAM:    General: Well developed; well nourished; in no acute distress  Eyes: PERRLA, EOMI; conjunctiva and sclera clear  Head: Normocephalic; atraumatic  Respiratory: No wheezes, rales or rhonchi  Cardiovascular: Regular rate and rhythm. S1 and S2 Normal; No murmurs, gallops or rubs  Gastrointestinal: Soft non-tender non-distended; Normal bowel sounds  Genitourinary: No costovertebral angle tenderness  Extremities: Normal range of motion, No clubbing, cyanosis or edema  Neurological: Alert and oriented x4  Musculoskeletal: Normal gait, tone, without deformities  Psychiatric: Cooperative and appropriate    Discharge time 32 minutes 53 years old male with PMH of Marijuana Use, DM (Diet controlled), HTN, HLD (Diet Controlled), Asthma and Bipolar Disorder comes with nausea and vomiting. As per patient, he has chronic abdominal pain and gets intermittent nausea/vomiting for 18 months. He has been seen by GI and had extensive work up and so far no abnormality has been found. He had NM Gastric Emptying Study yesterday and it ruled out Gastroparesis. Yesterday after the test, he went home and started having nausea and vomiting (non bilious and non bloody) so he came to the hospital. Denies fever, diarrhea or eating anything different. Denies relation with Marijuana. No relief with hot shower.    Patient underwent EGD today that showed gastritis. After procedure patient feeling better and wants to go home. GI has no other recommendations or reservations on patient being discharged today.    Patient refused urinary tox screen on this admission.    We counseled him on marijuana use as a possible cause of his symptoms. He agreed to stop his use of marijuana.    ICU Vital Signs Last 24 Hrs  T(C): 36.7 (26 Sep 2018 10:29), Max: 37.6 (25 Sep 2018 20:25)  T(F): 98 (26 Sep 2018 10:29), Max: 99.6 (25 Sep 2018 20:25)  HR: 79 (26 Sep 2018 10:29) (63 - 81)  BP: 172/104 (26 Sep 2018 10:29) (115/72 - 172/104)  BP(mean): --  ABP: --  ABP(mean): --  RR: 20 (26 Sep 2018 10:29) (18 - 20)  SpO2: 96% (26 Sep 2018 04:03) (96% - 97%)    PHYSICAL EXAM:    General: Well developed; well nourished; in no acute distress  Eyes: PERRLA, EOMI; conjunctiva and sclera clear  Head: Normocephalic; atraumatic  Respiratory: No wheezes, rales or rhonchi  Cardiovascular: Regular rate and rhythm. S1 and S2 Normal; No murmurs, gallops or rubs  Gastrointestinal: Soft non-tender non-distended; Normal bowel sounds  Genitourinary: No costovertebral angle tenderness  Extremities: Normal range of motion, No clubbing, cyanosis or edema  Neurological: Alert and oriented x4  Musculoskeletal: Normal gait, tone, without deformities  Psychiatric: Cooperative and appropriate    Discharge time 32 minutes

## 2018-09-26 NOTE — DISCHARGE NOTE ADULT - MEDICATION SUMMARY - MEDICATIONS TO TAKE
I will START or STAY ON the medications listed below when I get home from the hospital:    oxyCODONE 10 mg oral tablet  -- orally 5 times a day  -- Indication: For Chronic back pain    Depakote  mg oral tablet, extended release  -- 1 tab(s) by mouth once a day  -- Indication: For Depression    Zofran 4 mg oral tablet  -- 1 tab(s) by mouth every 4 hours, As Needed -for nausea   -- Indication: For Nausea    amLODIPine 10 mg oral tablet  -- 1 tab(s) by mouth once a day  -- Indication: For HTN (hypertension)    tiZANidine 4 mg oral tablet  -- 1 tab(s) by mouth once a day (at bedtime)  -- Indication: For Chronic back pain    Protonix 40 mg oral delayed release tablet  -- 1 tab(s) by mouth once a day   -- It is very important that you take or use this exactly as directed.  Do not skip doses or discontinue unless directed by your doctor.  Obtain medical advice before taking any non-prescription drugs as some may affect the action of this medication.  Swallow whole.  Do not crush.    -- Indication: For GERD

## 2018-09-29 LAB — SURGICAL PATHOLOGY FINAL REPORT - CH: SIGNIFICANT CHANGE UP

## 2018-10-01 ENCOUNTER — RX RENEWAL (OUTPATIENT)
Age: 53
End: 2018-10-01

## 2018-10-01 ENCOUNTER — OTHER (OUTPATIENT)
Age: 53
End: 2018-10-01

## 2018-10-11 ENCOUNTER — EMERGENCY (EMERGENCY)
Facility: HOSPITAL | Age: 53
LOS: 1 days | Discharge: HOME | End: 2018-10-11
Attending: EMERGENCY MEDICINE
Payer: MEDICARE

## 2018-10-11 VITALS — WEIGHT: 177.91 LBS

## 2018-10-11 VITALS
RESPIRATION RATE: 19 BRPM | TEMPERATURE: 100 F | HEART RATE: 94 BPM | OXYGEN SATURATION: 97 % | DIASTOLIC BLOOD PRESSURE: 94 MMHG | SYSTOLIC BLOOD PRESSURE: 157 MMHG

## 2018-10-11 DIAGNOSIS — Z98.89 OTHER SPECIFIED POSTPROCEDURAL STATES: Chronic | ICD-10-CM

## 2018-10-11 DIAGNOSIS — Z98.890 OTHER SPECIFIED POSTPROCEDURAL STATES: Chronic | ICD-10-CM

## 2018-10-11 PROBLEM — I10 ESSENTIAL (PRIMARY) HYPERTENSION: Chronic | Status: ACTIVE | Noted: 2018-09-25

## 2018-10-11 PROBLEM — M54.9 DORSALGIA, UNSPECIFIED: Chronic | Status: ACTIVE | Noted: 2018-09-25

## 2018-10-11 PROBLEM — M25.562 PAIN IN LEFT KNEE: Chronic | Status: ACTIVE | Noted: 2018-09-25

## 2018-10-11 PROBLEM — E78.5 HYPERLIPIDEMIA, UNSPECIFIED: Chronic | Status: ACTIVE | Noted: 2018-09-25

## 2018-10-11 LAB
ALBUMIN SERPL ELPH-MCNC: 5.3 G/DL — HIGH (ref 3.3–5.2)
ALP SERPL-CCNC: 45 U/L — SIGNIFICANT CHANGE UP (ref 40–120)
ALT FLD-CCNC: 45 U/L — HIGH
ANION GAP SERPL CALC-SCNC: 20 MMOL/L — HIGH (ref 5–17)
AST SERPL-CCNC: 28 U/L — SIGNIFICANT CHANGE UP
BASOPHILS # BLD AUTO: 0 K/UL — SIGNIFICANT CHANGE UP (ref 0–0.2)
BASOPHILS NFR BLD AUTO: 0.2 % — SIGNIFICANT CHANGE UP (ref 0–2)
BILIRUB SERPL-MCNC: 0.6 MG/DL — SIGNIFICANT CHANGE UP (ref 0.4–2)
BUN SERPL-MCNC: 14 MG/DL — SIGNIFICANT CHANGE UP (ref 8–20)
CALCIUM SERPL-MCNC: 10.5 MG/DL — HIGH (ref 8.6–10.2)
CHLORIDE SERPL-SCNC: 103 MMOL/L — SIGNIFICANT CHANGE UP (ref 98–107)
CO2 SERPL-SCNC: 19 MMOL/L — LOW (ref 22–29)
CREAT SERPL-MCNC: 0.84 MG/DL — SIGNIFICANT CHANGE UP (ref 0.5–1.3)
EOSINOPHIL # BLD AUTO: 0 K/UL — SIGNIFICANT CHANGE UP (ref 0–0.5)
EOSINOPHIL NFR BLD AUTO: 0 % — SIGNIFICANT CHANGE UP (ref 0–5)
GLUCOSE SERPL-MCNC: 124 MG/DL — HIGH (ref 70–115)
HCT VFR BLD CALC: 47.4 % — SIGNIFICANT CHANGE UP (ref 42–52)
HGB BLD-MCNC: 16.2 G/DL — SIGNIFICANT CHANGE UP (ref 14–18)
LIDOCAIN IGE QN: 36 U/L — SIGNIFICANT CHANGE UP (ref 22–51)
LYMPHOCYTES # BLD AUTO: 0.9 K/UL — LOW (ref 1–4.8)
LYMPHOCYTES # BLD AUTO: 8.2 % — LOW (ref 20–55)
MCHC RBC-ENTMCNC: 30.7 PG — SIGNIFICANT CHANGE UP (ref 27–31)
MCHC RBC-ENTMCNC: 34.2 G/DL — SIGNIFICANT CHANGE UP (ref 32–36)
MCV RBC AUTO: 89.8 FL — SIGNIFICANT CHANGE UP (ref 80–94)
MONOCYTES # BLD AUTO: 0.6 K/UL — SIGNIFICANT CHANGE UP (ref 0–0.8)
MONOCYTES NFR BLD AUTO: 5.7 % — SIGNIFICANT CHANGE UP (ref 3–10)
NEUTROPHILS # BLD AUTO: 9.2 K/UL — HIGH (ref 1.8–8)
NEUTROPHILS NFR BLD AUTO: 85.6 % — HIGH (ref 37–73)
PLATELET # BLD AUTO: 291 K/UL — SIGNIFICANT CHANGE UP (ref 150–400)
POTASSIUM SERPL-MCNC: 3.4 MMOL/L — LOW (ref 3.5–5.3)
POTASSIUM SERPL-SCNC: 3.4 MMOL/L — LOW (ref 3.5–5.3)
PROT SERPL-MCNC: 8.8 G/DL — HIGH (ref 6.6–8.7)
RBC # BLD: 5.28 M/UL — SIGNIFICANT CHANGE UP (ref 4.6–6.2)
RBC # FLD: 13 % — SIGNIFICANT CHANGE UP (ref 11–15.6)
SODIUM SERPL-SCNC: 142 MMOL/L — SIGNIFICANT CHANGE UP (ref 135–145)
WBC # BLD: 10.8 K/UL — SIGNIFICANT CHANGE UP (ref 4.8–10.8)
WBC # FLD AUTO: 10.8 K/UL — SIGNIFICANT CHANGE UP (ref 4.8–10.8)

## 2018-10-11 PROCEDURE — 93005 ELECTROCARDIOGRAM TRACING: CPT

## 2018-10-11 PROCEDURE — 85027 COMPLETE CBC AUTOMATED: CPT

## 2018-10-11 PROCEDURE — 74022 RADEX COMPL AQT ABD SERIES: CPT

## 2018-10-11 PROCEDURE — 74174 CTA ABD&PLVS W/CONTRAST: CPT

## 2018-10-11 PROCEDURE — 74174 CTA ABD&PLVS W/CONTRAST: CPT | Mod: 26

## 2018-10-11 PROCEDURE — 96374 THER/PROPH/DIAG INJ IV PUSH: CPT

## 2018-10-11 PROCEDURE — 99284 EMERGENCY DEPT VISIT MOD MDM: CPT

## 2018-10-11 PROCEDURE — 80053 COMPREHEN METABOLIC PANEL: CPT

## 2018-10-11 PROCEDURE — 96375 TX/PRO/DX INJ NEW DRUG ADDON: CPT | Mod: XU

## 2018-10-11 PROCEDURE — 36415 COLL VENOUS BLD VENIPUNCTURE: CPT

## 2018-10-11 PROCEDURE — 83690 ASSAY OF LIPASE: CPT

## 2018-10-11 PROCEDURE — 74022 RADEX COMPL AQT ABD SERIES: CPT | Mod: 26

## 2018-10-11 PROCEDURE — 99284 EMERGENCY DEPT VISIT MOD MDM: CPT | Mod: 25

## 2018-10-11 PROCEDURE — 93010 ELECTROCARDIOGRAM REPORT: CPT

## 2018-10-11 RX ORDER — HALOPERIDOL DECANOATE 100 MG/ML
5 INJECTION INTRAMUSCULAR ONCE
Qty: 0 | Refills: 0 | Status: COMPLETED | OUTPATIENT
Start: 2018-10-11 | End: 2018-10-11

## 2018-10-11 RX ORDER — METOCLOPRAMIDE HCL 10 MG
10 TABLET ORAL ONCE
Qty: 0 | Refills: 0 | Status: DISCONTINUED | OUTPATIENT
Start: 2018-10-11 | End: 2018-10-11

## 2018-10-11 RX ORDER — POTASSIUM CHLORIDE 20 MEQ
40 PACKET (EA) ORAL ONCE
Qty: 0 | Refills: 0 | Status: COMPLETED | OUTPATIENT
Start: 2018-10-11 | End: 2018-10-11

## 2018-10-11 RX ORDER — SODIUM CHLORIDE 9 MG/ML
1000 INJECTION INTRAMUSCULAR; INTRAVENOUS; SUBCUTANEOUS ONCE
Qty: 0 | Refills: 0 | Status: COMPLETED | OUTPATIENT
Start: 2018-10-11 | End: 2018-10-11

## 2018-10-11 RX ORDER — KETOROLAC TROMETHAMINE 30 MG/ML
30 SYRINGE (ML) INJECTION ONCE
Qty: 0 | Refills: 0 | Status: DISCONTINUED | OUTPATIENT
Start: 2018-10-11 | End: 2018-10-11

## 2018-10-11 RX ORDER — ONDANSETRON 8 MG/1
1 TABLET, FILM COATED ORAL
Qty: 9 | Refills: 0
Start: 2018-10-11 | End: 2018-10-13

## 2018-10-11 RX ORDER — METOCLOPRAMIDE HCL 10 MG
10 TABLET ORAL ONCE
Qty: 0 | Refills: 0 | Status: COMPLETED | OUTPATIENT
Start: 2018-10-11 | End: 2018-10-11

## 2018-10-11 RX ORDER — ACETAMINOPHEN 500 MG
1000 TABLET ORAL ONCE
Qty: 0 | Refills: 0 | Status: COMPLETED | OUTPATIENT
Start: 2018-10-11 | End: 2018-10-11

## 2018-10-11 RX ORDER — SODIUM CHLORIDE 9 MG/ML
1000 INJECTION, SOLUTION INTRAVENOUS ONCE
Qty: 0 | Refills: 0 | Status: COMPLETED | OUTPATIENT
Start: 2018-10-11 | End: 2018-10-11

## 2018-10-11 RX ADMIN — Medication 60 MILLIGRAM(S): at 19:40

## 2018-10-11 RX ADMIN — SODIUM CHLORIDE 1000 MILLILITER(S): 9 INJECTION INTRAMUSCULAR; INTRAVENOUS; SUBCUTANEOUS at 14:40

## 2018-10-11 RX ADMIN — Medication 40 MILLIEQUIVALENT(S): at 18:23

## 2018-10-11 RX ADMIN — HALOPERIDOL DECANOATE 5 MILLIGRAM(S): 100 INJECTION INTRAMUSCULAR at 14:15

## 2018-10-11 RX ADMIN — Medication 400 MILLIGRAM(S): at 18:22

## 2018-10-11 RX ADMIN — Medication 10 MILLIGRAM(S): at 18:23

## 2018-10-11 RX ADMIN — SODIUM CHLORIDE 1000 MILLILITER(S): 9 INJECTION INTRAMUSCULAR; INTRAVENOUS; SUBCUTANEOUS at 14:20

## 2018-10-11 NOTE — ED PROVIDER NOTE - CARE PLAN
Principal Discharge DX:	Generalized abdominal pain  Secondary Diagnosis:	Nausea and vomiting Principal Discharge DX:	Generalized abdominal pain  Assessment and plan of treatment:	1. Return to ED for worsening, progressive or any other concerning symptoms   2. Follow up with your primary care doctor in 2-3days   3. Take your H. Pylori medications as directed   4. Take 40mg of prednisone once a day for 4 days for your Mesenteritis   5. Follow up with your gastroenterologist  Secondary Diagnosis:	Nausea and vomiting  Secondary Diagnosis:	Panniculitis

## 2018-10-11 NOTE — ED STATDOCS - NS_ ATTENDINGSCRIBEDETAILS _ED_A_ED_FT
I, Jesus Alegre, performed the initial face to face bedside interview with this patient regarding history of present illness, review of symptoms and relevant past medical, social and family history.  I completed an independent physical examination.    The history, relevant review of systems, past medical and surgical history, medical decision making, and physical examination was documented by the scribe in my presence and I attest to the accuracy of the documentation.

## 2018-10-11 NOTE — ED PROVIDER NOTE - OBJECTIVE STATEMENT
52yo M with chronic intermittent abd pain, comes in waves, also with vomiting- NBNB- yellow. pain diffuse/cramping. not improved with vomiting. no h/o abd surgery. 1 episode of diarrhea today. has been seen and admitted for this 52yo M with chronic intermittent abd pain, comes in waves, also with vomiting- NBNB- yellow. pain diffuse/cramping. not improved with vomiting. no h/o abd surgery. 1 episode of diarrhea today. has been seen and admitted for this multiple times. currently being tx for H pylori infection. denies marijuana smoking states quit. no fever/chills. no sick contacts. no recent travel.

## 2018-10-11 NOTE — ED ADULT TRIAGE NOTE - CHIEF COMPLAINT QUOTE
Pt comes to ED c/o " H Pylori." Family reports pt was recently admitted and discharged for same about 14 days ago. Pt c/o abdominal pain and and vomiting since last night.

## 2018-10-11 NOTE — ED STATDOCS - PHYSICAL EXAMINATION
Const: Uncomfortable appearing, well nourished, and in severe distress. Over-exaggerating pain. Abd: Abdomen soft, and non-distended. Bowel sounds present. R upper gastric tenderness.

## 2018-10-11 NOTE — ED ADULT NURSE NOTE - OBJECTIVE STATEMENT
patient c/o abdominal pain since last night generlized. was diagnosed with H. Pylori 3 days ago by GI.

## 2018-10-11 NOTE — ED PROVIDER NOTE - MEDICAL DECISION MAKING DETAILS
patient with recurrent ED visits for nonspecific abd pain and vomitin, has had 2 neative CT in last 2 months, EGD with H pylori/gastritis, and normal emptying studing. denying marijuana smoking at this time- but very defensive when asked. could be hyperemesis/cyclical vomiting. will tx with tylenol/haldol. basic labs due to vomiting. no concern for acute surgical pathology at this time will Follow up xray ordered by triage physician

## 2018-10-11 NOTE — ED PROVIDER NOTE - DISCHARGE DATE
Ongoing SW/CM Assessment/Plan of Care Note     See SW/CM flowsheets for goals and other objective data.    Patient/Family discharge goal (s):  Goal #1: Extended Care Facility discharge arranged  Goal #2: Psychosocial needs assessed  Goal #3: Communication facilitated    PT Recommendation:  Recommendation for Discharge: PT: 24 Hour assist, Post acute therapy (possibly home with home therapy)    OT Recommendation:  Recommendations for Discharge: OT: Post acute therapy    SLP Recommendation:       Disposition:  Planned Discharge Destination: Rehabilitation/Skilled Care    Progress note:   SW communicated with pending SNF rehab referrals, additional information faxed.     [facility] do to no bed availability   [facility]- clinically accept pt- to start insurance prior authorization process   [facility]- clinically accepts- However are Out of insurance network.             Addendum (1:30P):  SW spoke with [facility] admissions and they clarified that they processed pt's health insurance and their SNF rehab is out of network with pt's [employer] HMO- pt does not have out of network benefits per SNF admissions therefore they DENY pt for SNF rehab consideration. SW to F/U with pt and await other pending referrals out.        (2:30P): DALILA spoke with [facility] admissions- will move towards obtaining insurance authorization for [facility].     11-Oct-2018

## 2018-10-11 NOTE — ED STATDOCS - PROGRESS NOTE DETAILS
54 y/o M pt with hx of present to ED c/o asthma, DM, HLD, HTN, and gastritis presents to ED c/o abdominal pain: H Pylori, and weakness. Pt notes vomiting since last night. Per family reports pt was recently admitted and discharged for same symptoms/issues about 14 days ago. Pt is a non-smoker, does not consume ETOH. Pt was given 3 antibiotics (6 days left) for his H Pylori. Denies . No further complaints at this time.  PE: Const: Uncomfortable appearing, well nourished, and in severe distress. Over-exaggerating pain. Abd: Abdomen soft, and non-distended. Bowel sounds present. R upper gastric tenderness.

## 2018-10-11 NOTE — ED PROVIDER NOTE - PLAN OF CARE
1. Return to ED for worsening, progressive or any other concerning symptoms   2. Follow up with your primary care doctor in 2-3days   3. Take your H. Pylori medications as directed   4. Take 40mg of prednisone once a day for 4 days for your Mesenteritis   5. Follow up with your gastroenterologist

## 2018-10-11 NOTE — ED ADULT NURSE NOTE - NSIMPLEMENTINTERV_GEN_ALL_ED
Implemented All Universal Safety Interventions:  [place] to call system. Call bell, personal items and telephone within reach. Instruct patient to call for assistance. Room bathroom lighting operational. Non-slip footwear when patient is off stretcher. Physically safe environment: no spills, clutter or unnecessary equipment. Stretcher in lowest position, wheels locked, appropriate side rails in place.

## 2018-10-11 NOTE — ED PROVIDER NOTE - PHYSICAL EXAMINATION
Gen: patient hysterical, writhing in bed, crying at times, yelling, AOx3  Head: NCAT  HEENT: PERRL, EOMI, oral mucosa moist, normal conjunctiva, neck supple  Lung: CTAB, no respiratory distress  CV: rrr, no murmur, Normal perfusion  Abd: diffusely tender to minimal palpation- but no distension, soft, no ttp when distracted  MSK: No edema, no visible deformities  Neuro: No focal neurologic deficits  Skin: No rash   Psych: normal affect

## 2018-10-11 NOTE — ED PROVIDER NOTE - PROGRESS NOTE DETAILS
patient sleeping, pain significantly improved, abdomen nontender. has hypokalemia will repleate. +hypercalcemia possibly related to dehydration as well as anion gap and HCO of 19, will hydrate. and repeat BMP -Komal DO patient states mdeications helped but still with vomiting/pain, has diffuse tenderness again on exam. discussed with patient tx symptoms and possible repeat CT due to persistent pain out of proportion. will tx symptoms, lactate and CTA -Komal DO pain improved, tolerating PO. +mesenteritis will give steroids and have Follow up outpt with YANA Ceja DO

## 2018-10-19 ENCOUNTER — CHART COPY (OUTPATIENT)
Age: 53
End: 2018-10-19

## 2018-10-24 PROCEDURE — 81003 URINALYSIS AUTO W/O SCOPE: CPT

## 2018-10-24 PROCEDURE — 88342 IMHCHEM/IMCYTCHM 1ST ANTB: CPT

## 2018-10-24 PROCEDURE — 83735 ASSAY OF MAGNESIUM: CPT

## 2018-10-24 PROCEDURE — 82962 GLUCOSE BLOOD TEST: CPT

## 2018-10-24 PROCEDURE — 80164 ASSAY DIPROPYLACETIC ACD TOT: CPT

## 2018-10-24 PROCEDURE — 76700 US EXAM ABDOM COMPLETE: CPT

## 2018-10-24 PROCEDURE — 43239 EGD BIOPSY SINGLE/MULTIPLE: CPT

## 2018-10-24 PROCEDURE — 83690 ASSAY OF LIPASE: CPT

## 2018-10-24 PROCEDURE — G0378: CPT

## 2018-10-24 PROCEDURE — 74177 CT ABD & PELVIS W/CONTRAST: CPT

## 2018-10-24 PROCEDURE — 99285 EMERGENCY DEPT VISIT HI MDM: CPT | Mod: 25

## 2018-10-24 PROCEDURE — 80053 COMPREHEN METABOLIC PANEL: CPT

## 2018-10-24 PROCEDURE — 82550 ASSAY OF CK (CPK): CPT

## 2018-10-24 PROCEDURE — 36415 COLL VENOUS BLD VENIPUNCTURE: CPT

## 2018-10-24 PROCEDURE — 96375 TX/PRO/DX INJ NEW DRUG ADDON: CPT

## 2018-10-24 PROCEDURE — 83605 ASSAY OF LACTIC ACID: CPT

## 2018-10-24 PROCEDURE — 85027 COMPLETE CBC AUTOMATED: CPT

## 2018-10-24 PROCEDURE — 88305 TISSUE EXAM BY PATHOLOGIST: CPT

## 2018-10-24 PROCEDURE — 96376 TX/PRO/DX INJ SAME DRUG ADON: CPT

## 2018-10-24 PROCEDURE — 96361 HYDRATE IV INFUSION ADD-ON: CPT

## 2018-10-24 PROCEDURE — 96374 THER/PROPH/DIAG INJ IV PUSH: CPT

## 2018-10-24 PROCEDURE — 82553 CREATINE MB FRACTION: CPT

## 2018-10-24 PROCEDURE — 80048 BASIC METABOLIC PNL TOTAL CA: CPT

## 2018-10-24 PROCEDURE — 78264 GASTRIC EMPTYING IMG STUDY: CPT

## 2018-10-24 PROCEDURE — A9541: CPT

## 2018-11-14 ENCOUNTER — APPOINTMENT (OUTPATIENT)
Dept: GASTROENTEROLOGY | Facility: CLINIC | Age: 53
End: 2018-11-14

## 2018-11-21 ENCOUNTER — RX CHANGE (OUTPATIENT)
Age: 53
End: 2018-11-21

## 2018-11-21 LAB — UREA BREATH TEST QL: POSITIVE

## 2018-11-28 ENCOUNTER — OUTPATIENT (OUTPATIENT)
Dept: OUTPATIENT SERVICES | Facility: HOSPITAL | Age: 53
LOS: 1 days | End: 2018-11-28
Payer: MEDICARE

## 2018-11-28 DIAGNOSIS — Z98.89 OTHER SPECIFIED POSTPROCEDURAL STATES: Chronic | ICD-10-CM

## 2018-11-28 DIAGNOSIS — Z51.89 ENCOUNTER FOR OTHER SPECIFIED AFTERCARE: ICD-10-CM

## 2018-11-28 DIAGNOSIS — Z98.890 OTHER SPECIFIED POSTPROCEDURAL STATES: Chronic | ICD-10-CM

## 2018-11-28 DIAGNOSIS — M25.569 PAIN IN UNSPECIFIED KNEE: ICD-10-CM

## 2018-12-03 ENCOUNTER — CHART COPY (OUTPATIENT)
Age: 53
End: 2018-12-03

## 2019-01-14 ENCOUNTER — RESULT REVIEW (OUTPATIENT)
Age: 54
End: 2019-01-14

## 2019-01-14 ENCOUNTER — MEDICATION RENEWAL (OUTPATIENT)
Age: 54
End: 2019-01-14

## 2019-01-14 LAB — UREA BREATH TEST QL: NEGATIVE

## 2019-03-20 PROCEDURE — 97010 HOT OR COLD PACKS THERAPY: CPT | Mod: GA

## 2019-03-20 PROCEDURE — 97110 THERAPEUTIC EXERCISES: CPT | Mod: KX

## 2019-03-20 PROCEDURE — G8978: CPT | Mod: CM

## 2019-03-20 PROCEDURE — G8979: CPT | Mod: CL

## 2019-03-20 PROCEDURE — 97163 PT EVAL HIGH COMPLEX 45 MIN: CPT | Mod: KX

## 2019-05-31 ENCOUNTER — OUTPATIENT (OUTPATIENT)
Dept: OUTPATIENT SERVICES | Facility: HOSPITAL | Age: 54
LOS: 1 days | End: 2019-05-31
Payer: MEDICARE

## 2019-05-31 DIAGNOSIS — Z98.89 OTHER SPECIFIED POSTPROCEDURAL STATES: Chronic | ICD-10-CM

## 2019-05-31 DIAGNOSIS — Z98.890 OTHER SPECIFIED POSTPROCEDURAL STATES: Chronic | ICD-10-CM

## 2019-05-31 DIAGNOSIS — Z51.89 ENCOUNTER FOR OTHER SPECIFIED AFTERCARE: ICD-10-CM

## 2019-05-31 DIAGNOSIS — M54.16 RADICULOPATHY, LUMBAR REGION: ICD-10-CM

## 2019-06-21 ENCOUNTER — APPOINTMENT (OUTPATIENT)
Dept: GASTROENTEROLOGY | Facility: CLINIC | Age: 54
End: 2019-06-21
Payer: MEDICARE

## 2019-06-21 VITALS
HEART RATE: 80 BPM | BODY MASS INDEX: 28.58 KG/M2 | HEIGHT: 69 IN | WEIGHT: 193 LBS | DIASTOLIC BLOOD PRESSURE: 90 MMHG | SYSTOLIC BLOOD PRESSURE: 160 MMHG

## 2019-06-21 PROCEDURE — 99214 OFFICE O/P EST MOD 30 MIN: CPT

## 2019-06-21 PROCEDURE — 97163 PT EVAL HIGH COMPLEX 45 MIN: CPT

## 2019-06-21 PROCEDURE — 97110 THERAPEUTIC EXERCISES: CPT

## 2019-06-21 PROCEDURE — 97140 MANUAL THERAPY 1/> REGIONS: CPT

## 2019-06-21 PROCEDURE — 97010 HOT OR COLD PACKS THERAPY: CPT

## 2019-06-21 RX ORDER — BISMUTH SUBSALICYLATE 262 MG/1
262 TABLET, CHEWABLE ORAL 4 TIMES DAILY
Qty: 80 | Refills: 0 | Status: DISCONTINUED | COMMUNITY
Start: 2018-11-21 | End: 2019-06-21

## 2019-06-21 RX ORDER — LEVOFLOXACIN 500 MG/1
500 TABLET, FILM COATED ORAL
Qty: 20 | Refills: 0 | Status: DISCONTINUED | COMMUNITY
Start: 2018-11-21 | End: 2019-06-21

## 2019-06-21 RX ORDER — PANTOPRAZOLE 40 MG/1
40 TABLET, DELAYED RELEASE ORAL TWICE DAILY
Qty: 20 | Refills: 0 | Status: DISCONTINUED | COMMUNITY
Start: 2018-10-01 | End: 2019-06-21

## 2019-06-21 RX ORDER — METRONIDAZOLE 500 MG/1
500 TABLET ORAL TWICE DAILY
Qty: 10 | Refills: 0 | Status: DISCONTINUED | COMMUNITY
Start: 2018-10-01 | End: 2019-06-21

## 2019-06-21 RX ORDER — AMOXICILLIN 500 MG/1
500 TABLET, FILM COATED ORAL TWICE DAILY
Qty: 20 | Refills: 0 | Status: DISCONTINUED | COMMUNITY
Start: 2018-10-01 | End: 2019-06-21

## 2019-06-21 RX ORDER — PANTOPRAZOLE 40 MG/1
40 TABLET, DELAYED RELEASE ORAL TWICE DAILY
Qty: 20 | Refills: 0 | Status: DISCONTINUED | COMMUNITY
Start: 2018-11-21 | End: 2019-06-21

## 2019-06-21 RX ORDER — BISMUTH SUBCITRATE POTASSIUM, METRONIDAZOLE, AND TETRACYCLINE HYDROCHLORIDE 140; 125; 125 MG/1; MG/1; MG/1
140-125-125 CAPSULE ORAL 4 TIMES DAILY
Qty: 120 | Refills: 0 | Status: DISCONTINUED | COMMUNITY
Start: 2018-11-21 | End: 2019-06-21

## 2019-06-21 RX ORDER — METRONIDAZOLE 500 MG/1
500 TABLET ORAL EVERY 6 HOURS
Qty: 40 | Refills: 0 | Status: DISCONTINUED | COMMUNITY
Start: 2018-11-21 | End: 2019-06-21

## 2019-06-21 RX ORDER — CLARITHROMYCIN 500 MG/1
500 TABLET, FILM COATED ORAL TWICE DAILY
Qty: 10 | Refills: 1 | Status: DISCONTINUED | COMMUNITY
Start: 2018-10-01 | End: 2019-06-21

## 2019-06-21 NOTE — HISTORY OF PRESENT ILLNESS
[FreeTextEntry1] : 54 her  male with history of bipolar disorder and HP gastritis.\par Patient initially presented his outside hospital in 4/18 with abdominal pain to the emergency room. Blood work and CAT scan done and this screening for nephrolithiasis were negative. Treated and released. Subsequent ER evaluation 8/18 with similar epigastric pain with negative. Blood work and negative CT abdomen and pelvis. With oral and IV contrast.\par Cells were only seen by me in GI consultation in 9/2018 referred for nuclear medicine gastric emptying study on 9/18. Study was normal. Upper abdominal ultrasound examination was negative. Subsequently admitted with intractable abdominal pain, nausea and vomiting. This outside hospital. No evidence for pancreatitis. Blood work and CAT scan were normal. Upper endoscopy was normal. Subsequent pathology report revealed chronic active gastritis, H. pylori. Patient was treated with quadruple therapy and subsequent urea breath test was still positive. A second course of antibiotics comprising a Pylera equivalent course of antibiotics was given. \par A subsequent urea breath test was negative. Patient remained asymptomatic for several months.\par Patient now has noted recurrence of her abdominal pain in the epigastric region for the past 2 months. Pain seems to be worsened with intake of meals. No radiation of pain to the back or right upper quadrant. No associated nausea or vomiting or dehydration. Pain is chronic. No new NSAIDs. No aspirin. No other medical issues.

## 2019-06-21 NOTE — PHYSICAL EXAM
[Sclera] : the sclera and conjunctiva were normal [PERRL With Normal Accommodation] : pupils were equal in size, round, and reactive to light [Extraocular Movements] : extraocular movements were intact [Outer Ear] : the ears and nose were normal in appearance [Oropharynx] : the oropharynx was normal [Jugular Venous Distention Increased] : there was no jugular-venous distention [Neck Cervical Mass (___cm)] : no neck mass was observed [Neck Appearance] : the appearance of the neck was normal [Thyroid Nodule] : there were no palpable thyroid nodules [Thyroid Diffuse Enlargement] : the thyroid was not enlarged [Heart Rate And Rhythm] : heart rate was normal and rhythm regular [Auscultation Breath Sounds / Voice Sounds] : lungs were clear to auscultation bilaterally [Heart Sounds Gallop] : no gallops [Heart Sounds] : normal S1 and S2 [Heart Sounds Pericardial Friction Rub] : no pericardial rub [Murmurs] : no murmurs [Abdomen Soft] : soft [Abdomen Tenderness] : non-tender [Bowel Sounds] : normal bowel sounds [Abdomen Mass (___ Cm)] : no abdominal mass palpated [] : no hepato-splenomegaly [Internal Hemorrhoid] : no internal hemorrhoids [No Rectal Mass] : no rectal mass [Normal Sphincter Tone] : normal sphincter tone [External Hemorrhoid] : no external hemorrhoids [Occult Blood Positive] : stool was negative for occult blood [No CVA Tenderness] : no ~M costovertebral angle tenderness [No Spinal Tenderness] : no spinal tenderness [Abnormal Walk] : normal gait [Nail Clubbing] : no clubbing  or cyanosis of the fingernails [Musculoskeletal - Swelling] : no joint swelling seen [Motor Tone] : muscle strength and tone were normal

## 2019-06-21 NOTE — ASSESSMENT
[FreeTextEntry1] : Recurrent upper abdominal pain. No prior evidence for gallstones or pancreatitis or gastric motility disorder. Prior endoscopy, normal. We'll symptoms appear to be upper in origin. Possible recurrence of H. pylori infection.\par Once blood work and urea breath test completed. Then start pantoprazole 40 mg p.o. q.d. Perform repeat abdominal sonogram to exclude gallstones.\par GI office followup in 2 months.

## 2019-06-24 LAB
ALBUMIN SERPL ELPH-MCNC: 5 G/DL
ALP BLD-CCNC: 46 U/L
ALT SERPL-CCNC: 60 U/L
AMYLASE/CREAT SERPL: 98 U/L
ANION GAP SERPL CALC-SCNC: 14 MMOL/L
AST SERPL-CCNC: 27 U/L
BASOPHILS # BLD AUTO: 0.05 K/UL
BASOPHILS NFR BLD AUTO: 0.7 %
BILIRUB SERPL-MCNC: 0.2 MG/DL
BUN SERPL-MCNC: 15 MG/DL
CALCIUM SERPL-MCNC: 9.8 MG/DL
CHLORIDE SERPL-SCNC: 107 MMOL/L
CO2 SERPL-SCNC: 20 MMOL/L
CREAT SERPL-MCNC: 0.86 MG/DL
EOSINOPHIL # BLD AUTO: 0.03 K/UL
EOSINOPHIL NFR BLD AUTO: 0.4 %
GLUCOSE SERPL-MCNC: 108 MG/DL
HCT VFR BLD CALC: 46.4 %
HGB BLD-MCNC: 15.3 G/DL
IMM GRANULOCYTES NFR BLD AUTO: 0.4 %
LPL SERPL-CCNC: 49 U/L
LYMPHOCYTES # BLD AUTO: 2.18 K/UL
LYMPHOCYTES NFR BLD AUTO: 32.2 %
MAN DIFF?: NORMAL
MCHC RBC-ENTMCNC: 30.5 PG
MCHC RBC-ENTMCNC: 33 GM/DL
MCV RBC AUTO: 92.6 FL
MONOCYTES # BLD AUTO: 0.61 K/UL
MONOCYTES NFR BLD AUTO: 9 %
NEUTROPHILS # BLD AUTO: 3.86 K/UL
NEUTROPHILS NFR BLD AUTO: 57.3 %
PLATELET # BLD AUTO: 292 K/UL
POTASSIUM SERPL-SCNC: 4.9 MMOL/L
PROT SERPL-MCNC: 7.6 G/DL
RBC # BLD: 5.01 M/UL
RBC # FLD: 13.2 %
SODIUM SERPL-SCNC: 141 MMOL/L
UREA BREATH TEST QL: NEGATIVE
WBC # FLD AUTO: 6.76 K/UL

## 2019-06-27 ENCOUNTER — FORM ENCOUNTER (OUTPATIENT)
Age: 54
End: 2019-06-27

## 2019-06-28 ENCOUNTER — OUTPATIENT (OUTPATIENT)
Dept: OUTPATIENT SERVICES | Facility: HOSPITAL | Age: 54
LOS: 1 days | End: 2019-06-28
Payer: MEDICARE

## 2019-06-28 ENCOUNTER — APPOINTMENT (OUTPATIENT)
Dept: ULTRASOUND IMAGING | Facility: CLINIC | Age: 54
End: 2019-06-28
Payer: MEDICARE

## 2019-06-28 DIAGNOSIS — R10.13 EPIGASTRIC PAIN: ICD-10-CM

## 2019-06-28 DIAGNOSIS — Z98.890 OTHER SPECIFIED POSTPROCEDURAL STATES: Chronic | ICD-10-CM

## 2019-06-28 DIAGNOSIS — Z98.89 OTHER SPECIFIED POSTPROCEDURAL STATES: Chronic | ICD-10-CM

## 2019-06-28 PROCEDURE — 76700 US EXAM ABDOM COMPLETE: CPT

## 2019-06-28 PROCEDURE — 76700 US EXAM ABDOM COMPLETE: CPT | Mod: 26

## 2019-08-19 ENCOUNTER — APPOINTMENT (OUTPATIENT)
Dept: GASTROENTEROLOGY | Facility: CLINIC | Age: 54
End: 2019-08-19

## 2019-09-09 ENCOUNTER — EMERGENCY (EMERGENCY)
Facility: HOSPITAL | Age: 54
LOS: 1 days | Discharge: HOME | End: 2019-09-09
Attending: EMERGENCY MEDICINE
Payer: MEDICARE

## 2019-09-09 VITALS
DIASTOLIC BLOOD PRESSURE: 79 MMHG | OXYGEN SATURATION: 99 % | SYSTOLIC BLOOD PRESSURE: 158 MMHG | RESPIRATION RATE: 18 BRPM | HEART RATE: 96 BPM

## 2019-09-09 VITALS
RESPIRATION RATE: 18 BRPM | DIASTOLIC BLOOD PRESSURE: 93 MMHG | HEIGHT: 69 IN | SYSTOLIC BLOOD PRESSURE: 146 MMHG | HEART RATE: 98 BPM | TEMPERATURE: 98 F | OXYGEN SATURATION: 100 % | WEIGHT: 192.02 LBS

## 2019-09-09 DIAGNOSIS — Z98.890 OTHER SPECIFIED POSTPROCEDURAL STATES: Chronic | ICD-10-CM

## 2019-09-09 DIAGNOSIS — Z98.89 OTHER SPECIFIED POSTPROCEDURAL STATES: Chronic | ICD-10-CM

## 2019-09-09 LAB
ALBUMIN SERPL ELPH-MCNC: 5 G/DL — SIGNIFICANT CHANGE UP (ref 3.3–5.2)
ALP SERPL-CCNC: 47 U/L — SIGNIFICANT CHANGE UP (ref 40–120)
ALT FLD-CCNC: 75 U/L — HIGH
ANION GAP SERPL CALC-SCNC: 13 MMOL/L — SIGNIFICANT CHANGE UP (ref 5–17)
ANION GAP SERPL CALC-SCNC: 17 MMOL/L — SIGNIFICANT CHANGE UP (ref 5–17)
APPEARANCE UR: CLEAR — SIGNIFICANT CHANGE UP
APTT BLD: 32.2 SEC — SIGNIFICANT CHANGE UP (ref 27.5–36.3)
AST SERPL-CCNC: 45 U/L — HIGH
BASOPHILS # BLD AUTO: 0.04 K/UL — SIGNIFICANT CHANGE UP (ref 0–0.2)
BASOPHILS NFR BLD AUTO: 0.4 % — SIGNIFICANT CHANGE UP (ref 0–2)
BILIRUB SERPL-MCNC: 0.4 MG/DL — SIGNIFICANT CHANGE UP (ref 0.4–2)
BILIRUB UR-MCNC: NEGATIVE — SIGNIFICANT CHANGE UP
BUN SERPL-MCNC: 10 MG/DL — SIGNIFICANT CHANGE UP (ref 8–20)
BUN SERPL-MCNC: 12 MG/DL — SIGNIFICANT CHANGE UP (ref 8–20)
CALCIUM SERPL-MCNC: 10.4 MG/DL — HIGH (ref 8.6–10.2)
CALCIUM SERPL-MCNC: 9.4 MG/DL — SIGNIFICANT CHANGE UP (ref 8.6–10.2)
CHLORIDE SERPL-SCNC: 104 MMOL/L — SIGNIFICANT CHANGE UP (ref 98–107)
CHLORIDE SERPL-SCNC: 105 MMOL/L — SIGNIFICANT CHANGE UP (ref 98–107)
CO2 SERPL-SCNC: 18 MMOL/L — LOW (ref 22–29)
CO2 SERPL-SCNC: 21 MMOL/L — LOW (ref 22–29)
COLOR SPEC: YELLOW — SIGNIFICANT CHANGE UP
CREAT SERPL-MCNC: 0.79 MG/DL — SIGNIFICANT CHANGE UP (ref 0.5–1.3)
CREAT SERPL-MCNC: 0.9 MG/DL — SIGNIFICANT CHANGE UP (ref 0.5–1.3)
DIFF PNL FLD: NEGATIVE — SIGNIFICANT CHANGE UP
EOSINOPHIL # BLD AUTO: 0 K/UL — SIGNIFICANT CHANGE UP (ref 0–0.5)
EOSINOPHIL NFR BLD AUTO: 0 % — SIGNIFICANT CHANGE UP (ref 0–6)
GLUCOSE SERPL-MCNC: 111 MG/DL — SIGNIFICANT CHANGE UP (ref 70–115)
GLUCOSE SERPL-MCNC: 128 MG/DL — HIGH (ref 70–115)
GLUCOSE UR QL: NEGATIVE MG/DL — SIGNIFICANT CHANGE UP
HCT VFR BLD CALC: 49.2 % — SIGNIFICANT CHANGE UP (ref 39–50)
HGB BLD-MCNC: 16.4 G/DL — SIGNIFICANT CHANGE UP (ref 13–17)
IMM GRANULOCYTES NFR BLD AUTO: 0.5 % — SIGNIFICANT CHANGE UP (ref 0–1.5)
INR BLD: 0.97 RATIO — SIGNIFICANT CHANGE UP (ref 0.88–1.16)
KETONES UR-MCNC: ABNORMAL
LACTATE BLDV-MCNC: 3.6 MMOL/L — HIGH (ref 0.5–2)
LEUKOCYTE ESTERASE UR-ACNC: NEGATIVE — SIGNIFICANT CHANGE UP
LIDOCAIN IGE QN: 34 U/L — SIGNIFICANT CHANGE UP (ref 22–51)
LYMPHOCYTES # BLD AUTO: 1.46 K/UL — SIGNIFICANT CHANGE UP (ref 1–3.3)
LYMPHOCYTES # BLD AUTO: 15.6 % — SIGNIFICANT CHANGE UP (ref 13–44)
MCHC RBC-ENTMCNC: 30 PG — SIGNIFICANT CHANGE UP (ref 27–34)
MCHC RBC-ENTMCNC: 33.3 GM/DL — SIGNIFICANT CHANGE UP (ref 32–36)
MCV RBC AUTO: 90.1 FL — SIGNIFICANT CHANGE UP (ref 80–100)
MONOCYTES # BLD AUTO: 0.56 K/UL — SIGNIFICANT CHANGE UP (ref 0–0.9)
MONOCYTES NFR BLD AUTO: 6 % — SIGNIFICANT CHANGE UP (ref 2–14)
NEUTROPHILS # BLD AUTO: 7.24 K/UL — SIGNIFICANT CHANGE UP (ref 1.8–7.4)
NEUTROPHILS NFR BLD AUTO: 77.5 % — HIGH (ref 43–77)
NITRITE UR-MCNC: NEGATIVE — SIGNIFICANT CHANGE UP
PH UR: 8 — SIGNIFICANT CHANGE UP (ref 5–8)
PLATELET # BLD AUTO: 269 K/UL — SIGNIFICANT CHANGE UP (ref 150–400)
POTASSIUM SERPL-MCNC: 4.5 MMOL/L — SIGNIFICANT CHANGE UP (ref 3.5–5.3)
POTASSIUM SERPL-MCNC: 5.7 MMOL/L — HIGH (ref 3.5–5.3)
POTASSIUM SERPL-SCNC: 4.5 MMOL/L — SIGNIFICANT CHANGE UP (ref 3.5–5.3)
POTASSIUM SERPL-SCNC: 5.7 MMOL/L — HIGH (ref 3.5–5.3)
PROT SERPL-MCNC: 9 G/DL — HIGH (ref 6.6–8.7)
PROT UR-MCNC: NEGATIVE MG/DL — SIGNIFICANT CHANGE UP
PROTHROM AB SERPL-ACNC: 11.1 SEC — SIGNIFICANT CHANGE UP (ref 10–12.9)
RBC # BLD: 5.46 M/UL — SIGNIFICANT CHANGE UP (ref 4.2–5.8)
RBC # FLD: 13.3 % — SIGNIFICANT CHANGE UP (ref 10.3–14.5)
SODIUM SERPL-SCNC: 139 MMOL/L — SIGNIFICANT CHANGE UP (ref 135–145)
SODIUM SERPL-SCNC: 139 MMOL/L — SIGNIFICANT CHANGE UP (ref 135–145)
SP GR SPEC: 1.01 — SIGNIFICANT CHANGE UP (ref 1.01–1.02)
TROPONIN T SERPL-MCNC: <0.01 NG/ML — SIGNIFICANT CHANGE UP (ref 0–0.06)
UROBILINOGEN FLD QL: NEGATIVE MG/DL — SIGNIFICANT CHANGE UP
WBC # BLD: 9.35 K/UL — SIGNIFICANT CHANGE UP (ref 3.8–10.5)
WBC # FLD AUTO: 9.35 K/UL — SIGNIFICANT CHANGE UP (ref 3.8–10.5)

## 2019-09-09 PROCEDURE — 83690 ASSAY OF LIPASE: CPT

## 2019-09-09 PROCEDURE — 96375 TX/PRO/DX INJ NEW DRUG ADDON: CPT

## 2019-09-09 PROCEDURE — 85730 THROMBOPLASTIN TIME PARTIAL: CPT

## 2019-09-09 PROCEDURE — 96374 THER/PROPH/DIAG INJ IV PUSH: CPT | Mod: XU

## 2019-09-09 PROCEDURE — 74019 RADEX ABDOMEN 2 VIEWS: CPT | Mod: 26

## 2019-09-09 PROCEDURE — 93005 ELECTROCARDIOGRAM TRACING: CPT

## 2019-09-09 PROCEDURE — 80053 COMPREHEN METABOLIC PANEL: CPT

## 2019-09-09 PROCEDURE — 83605 ASSAY OF LACTIC ACID: CPT

## 2019-09-09 PROCEDURE — 99284 EMERGENCY DEPT VISIT MOD MDM: CPT | Mod: 25

## 2019-09-09 PROCEDURE — 36415 COLL VENOUS BLD VENIPUNCTURE: CPT

## 2019-09-09 PROCEDURE — 74177 CT ABD & PELVIS W/CONTRAST: CPT

## 2019-09-09 PROCEDURE — 93010 ELECTROCARDIOGRAM REPORT: CPT

## 2019-09-09 PROCEDURE — 81003 URINALYSIS AUTO W/O SCOPE: CPT

## 2019-09-09 PROCEDURE — 85610 PROTHROMBIN TIME: CPT

## 2019-09-09 PROCEDURE — 84484 ASSAY OF TROPONIN QUANT: CPT

## 2019-09-09 PROCEDURE — 74019 RADEX ABDOMEN 2 VIEWS: CPT

## 2019-09-09 PROCEDURE — 87086 URINE CULTURE/COLONY COUNT: CPT

## 2019-09-09 PROCEDURE — 99284 EMERGENCY DEPT VISIT MOD MDM: CPT

## 2019-09-09 PROCEDURE — 74177 CT ABD & PELVIS W/CONTRAST: CPT | Mod: 26

## 2019-09-09 PROCEDURE — 80048 BASIC METABOLIC PNL TOTAL CA: CPT

## 2019-09-09 PROCEDURE — 85027 COMPLETE CBC AUTOMATED: CPT

## 2019-09-09 RX ORDER — OMEPRAZOLE 10 MG/1
1 CAPSULE, DELAYED RELEASE ORAL
Qty: 30 | Refills: 0
Start: 2019-09-09 | End: 2019-10-08

## 2019-09-09 RX ORDER — LIDOCAINE 4 G/100G
5 CREAM TOPICAL ONCE
Refills: 0 | Status: COMPLETED | OUTPATIENT
Start: 2019-09-09 | End: 2019-09-09

## 2019-09-09 RX ORDER — SODIUM CHLORIDE 9 MG/ML
2000 INJECTION, SOLUTION INTRAVENOUS ONCE
Refills: 0 | Status: COMPLETED | OUTPATIENT
Start: 2019-09-09 | End: 2019-09-09

## 2019-09-09 RX ORDER — PIPERACILLIN AND TAZOBACTAM 4; .5 G/20ML; G/20ML
3.38 INJECTION, POWDER, LYOPHILIZED, FOR SOLUTION INTRAVENOUS ONCE
Refills: 0 | Status: COMPLETED | OUTPATIENT
Start: 2019-09-09 | End: 2019-09-09

## 2019-09-09 RX ORDER — MORPHINE SULFATE 50 MG/1
4 CAPSULE, EXTENDED RELEASE ORAL ONCE
Refills: 0 | Status: DISCONTINUED | OUTPATIENT
Start: 2019-09-09 | End: 2019-09-09

## 2019-09-09 RX ORDER — ONDANSETRON 8 MG/1
8 TABLET, FILM COATED ORAL ONCE
Refills: 0 | Status: COMPLETED | OUTPATIENT
Start: 2019-09-09 | End: 2019-09-09

## 2019-09-09 RX ORDER — SUCRALFATE 1 G
1 TABLET ORAL ONCE
Refills: 0 | Status: COMPLETED | OUTPATIENT
Start: 2019-09-09 | End: 2019-09-09

## 2019-09-09 RX ORDER — FAMOTIDINE 10 MG/ML
20 INJECTION INTRAVENOUS ONCE
Refills: 0 | Status: COMPLETED | OUTPATIENT
Start: 2019-09-09 | End: 2019-09-09

## 2019-09-09 RX ORDER — MORPHINE SULFATE 50 MG/1
6 CAPSULE, EXTENDED RELEASE ORAL ONCE
Refills: 0 | Status: DISCONTINUED | OUTPATIENT
Start: 2019-09-09 | End: 2019-09-09

## 2019-09-09 RX ORDER — SUCRALFATE 1 G
1 TABLET ORAL
Qty: 120 | Refills: 0
Start: 2019-09-09 | End: 2019-10-08

## 2019-09-09 RX ORDER — HYDROMORPHONE HYDROCHLORIDE 2 MG/ML
1 INJECTION INTRAMUSCULAR; INTRAVENOUS; SUBCUTANEOUS ONCE
Refills: 0 | Status: DISCONTINUED | OUTPATIENT
Start: 2019-09-09 | End: 2019-09-09

## 2019-09-09 RX ADMIN — MORPHINE SULFATE 6 MILLIGRAM(S): 50 CAPSULE, EXTENDED RELEASE ORAL at 12:58

## 2019-09-09 RX ADMIN — PIPERACILLIN AND TAZOBACTAM 200 GRAM(S): 4; .5 INJECTION, POWDER, LYOPHILIZED, FOR SOLUTION INTRAVENOUS at 13:52

## 2019-09-09 RX ADMIN — HYDROMORPHONE HYDROCHLORIDE 1 MILLIGRAM(S): 2 INJECTION INTRAMUSCULAR; INTRAVENOUS; SUBCUTANEOUS at 14:10

## 2019-09-09 RX ADMIN — Medication 30 MILLILITER(S): at 16:43

## 2019-09-09 RX ADMIN — ONDANSETRON 8 MILLIGRAM(S): 8 TABLET, FILM COATED ORAL at 12:43

## 2019-09-09 RX ADMIN — FAMOTIDINE 20 MILLIGRAM(S): 10 INJECTION INTRAVENOUS at 12:43

## 2019-09-09 RX ADMIN — Medication 1 GRAM(S): at 16:44

## 2019-09-09 RX ADMIN — SODIUM CHLORIDE 2000 MILLILITER(S): 9 INJECTION, SOLUTION INTRAVENOUS at 12:58

## 2019-09-09 RX ADMIN — LIDOCAINE 5 MILLILITER(S): 4 CREAM TOPICAL at 12:43

## 2019-09-09 RX ADMIN — HYDROMORPHONE HYDROCHLORIDE 1 MILLIGRAM(S): 2 INJECTION INTRAMUSCULAR; INTRAVENOUS; SUBCUTANEOUS at 13:52

## 2019-09-09 RX ADMIN — MORPHINE SULFATE 6 MILLIGRAM(S): 50 CAPSULE, EXTENDED RELEASE ORAL at 13:15

## 2019-09-09 NOTE — ED ADULT TRIAGE NOTE - CHIEF COMPLAINT QUOTE
"my stomach is hurting and rodrigo been vomiting since 5am" c/o LLQ abd pain, nausea, vomiting and diarrhea. Pt reports similar symptoms and dx with H. pylori. Denies fevers denies sob.

## 2019-09-09 NOTE — ED ADULT NURSE REASSESSMENT NOTE - NS ED NURSE REASSESS COMMENT FT1
pt. a&ox3. pt. states he is feeling much better. pt. in no apparent distress at this time, breathing even and unlabored.

## 2019-09-09 NOTE — ED ADULT NURSE NOTE - OBJECTIVE STATEMENT
54 year old male comes to ED c/o abdominal pain, LLQ. pt. crying in pain. pt. states he has been vomiting all night. pt. denies fevers, cp, sob. 54 year old male comes to ED c/o abdominal pain, LLQ. pt. crying in pain. pt. states he has been vomiting all night. pt. denies fevers, cp, sob.  x weeks  4am this morning  h jose m  cleared up

## 2019-09-09 NOTE — ED PROVIDER NOTE - PATIENT PORTAL LINK FT
You can access the FollowMyHealth Patient Portal offered by [facility] by registering at the following website: [email or web address]. By joining [employer]’s FollowMyHealth portal, you will also be able to view your health information using other applications (apps) compatible with our system.

## 2019-09-09 NOTE — ED ADULT NURSE NOTE - NSIMPLEMENTINTERV_GEN_ALL_ED
Implemented All Universal Safety Interventions:  [place] to call system. Call bell, personal items and telephone within reach. Instruct patient to call for assistance. Room bathroom lighting operational. Non-slip footwear when patient is off stretcher. Physically safe environment: no spills, clutter or unnecessary equipment. Stretcher in lowest position, wheels locked, appropriate side rails in place.

## 2019-09-09 NOTE — ED PROVIDER NOTE - OBJECTIVE STATEMENT
54 year old male with PMh DM, HTN, LD, PUD, bipolar disorder presents with abd pain. Pt states that this morning at 4 am he developed a sharp epigatsric and LUQ abd pain, constant, severe, no radiation, and worse with movement. it is associated with multiple episodes of non-bilious emesis, the last episode of which was streaked with blood. He denies diarrhea, melena, dysuria, hematuria, fever, chills, chest pain, SOB. 54 year old male with PMh DM, HTN, LD, PUD, bipolar disorder presents with abd pain. Pt states that this morning at 4 am he developed a sharp epigatsric and LUQ abd pain, constant, severe, no radiation, and worse with movement. it is associated with multiple episodes of non-bilious emesis, the last episode of which was streaked with blood. He denies diarrhea, melena, dysuria, hematuria, fever, chills, chest pain, SOB.  Of note, tp states that he has a Hx of H. pylori and has had similar episodes in the past

## 2019-09-10 LAB
CULTURE RESULTS: NO GROWTH — SIGNIFICANT CHANGE UP
SPECIMEN SOURCE: SIGNIFICANT CHANGE UP

## 2019-10-07 ENCOUNTER — APPOINTMENT (OUTPATIENT)
Dept: GASTROENTEROLOGY | Facility: CLINIC | Age: 54
End: 2019-10-07
Payer: MEDICARE

## 2019-10-07 VITALS
RESPIRATION RATE: 14 BRPM | WEIGHT: 191 LBS | OXYGEN SATURATION: 98 % | HEART RATE: 79 BPM | HEIGHT: 69 IN | BODY MASS INDEX: 28.29 KG/M2 | SYSTOLIC BLOOD PRESSURE: 143 MMHG | DIASTOLIC BLOOD PRESSURE: 95 MMHG

## 2019-10-07 DIAGNOSIS — A04.8 OTHER SPECIFIED BACTERIAL INTESTINAL INFECTIONS: ICD-10-CM

## 2019-10-07 PROCEDURE — 99214 OFFICE O/P EST MOD 30 MIN: CPT

## 2019-10-07 PROCEDURE — 82272 OCCULT BLD FECES 1-3 TESTS: CPT

## 2019-10-07 RX ORDER — PANTOPRAZOLE 40 MG/1
40 TABLET, DELAYED RELEASE ORAL
Qty: 30 | Refills: 3 | Status: COMPLETED | COMMUNITY
Start: 2019-06-21 | End: 2019-10-07

## 2019-10-07 NOTE — ASSESSMENT
[FreeTextEntry1] : Unclear etiology, recurrent upper abdominal pain. Motility disturbance seems to have been excluded. No evidence for pancreatitis. Amylase, lipase, LFTs, and imaging have been negative for pancreatitis. Patient was treated empirically with PPI therapy and has been improvement. This is despite most recent upper endoscopy being completely normal one year ago. Patient had previously required 2 courses of antibiotics, to eradicate the H. pylori infection.  Therefore, we'll continue the PPI medication, continuously for the next 3 months. Office followup in 4-6 months. Repeat ED/ SSH evaluation of symptoms dramatically worsened

## 2019-10-07 NOTE — HISTORY OF PRESENT ILLNESS
[FreeTextEntry1] : 54-year-old,  male with history of hypertension, on several medications. Patient had admitted on multiple occasions over the past 2 years for evaluation of abdominal pain. Upper endoscopy was performed about a year ago and was normal, but gastric biopsies were positive for H. pylori. Patient was treated with 2 separate courses of antibiotics and finally the urea breath test reverted to negative. Patient was well for about 6 months and had recurrent abdominal pain. He has most recently been evaluated in [facility]19/94. Recurrent epigastric and left upper quadrant abdominal pain. Blood work and CAT scan were normal. He was newly treated with pantoprazole empirically. Medication has been taken for roughly 3 weeks since discharge from ER. He is considerably better, but not completely resolved. He denies dysphagia, odynophagia, or or dyspepsia. He has been no overt bleeding. He has not lost any significant weight. A prior gastric nuclear medicine emptying study was unrevealing.

## 2019-10-07 NOTE — PHYSICAL EXAM
[Sclera] : the sclera and conjunctiva were normal [PERRL With Normal Accommodation] : pupils were equal in size, round, and reactive to light [Extraocular Movements] : extraocular movements were intact [Outer Ear] : the ears and nose were normal in appearance [Oropharynx] : the oropharynx was normal [Neck Appearance] : the appearance of the neck was normal [Neck Cervical Mass (___cm)] : no neck mass was observed [Jugular Venous Distention Increased] : there was no jugular-venous distention [Thyroid Diffuse Enlargement] : the thyroid was not enlarged [Thyroid Nodule] : there were no palpable thyroid nodules [Auscultation Breath Sounds / Voice Sounds] : lungs were clear to auscultation bilaterally [Heart Rate And Rhythm] : heart rate was normal and rhythm regular [Heart Sounds] : normal S1 and S2 [Heart Sounds Gallop] : no gallops [Murmurs] : no murmurs [Heart Sounds Pericardial Friction Rub] : no pericardial rub [Bowel Sounds] : normal bowel sounds [Abdomen Soft] : soft [Abdomen Tenderness] : non-tender [] : no hepato-splenomegaly [Abdomen Mass (___ Cm)] : no abdominal mass palpated [Normal Sphincter Tone] : normal sphincter tone [No Rectal Mass] : no rectal mass [Internal Hemorrhoid] : no internal hemorrhoids [External Hemorrhoid] : no external hemorrhoids [Occult Blood Positive] : stool was negative for occult blood [No CVA Tenderness] : no ~M costovertebral angle tenderness [No Spinal Tenderness] : no spinal tenderness [Abnormal Walk] : normal gait [Nail Clubbing] : no clubbing  or cyanosis of the fingernails [Musculoskeletal - Swelling] : no joint swelling seen [Motor Tone] : muscle strength and tone were normal

## 2020-02-14 ENCOUNTER — APPOINTMENT (OUTPATIENT)
Dept: ORTHOPEDIC SURGERY | Facility: CLINIC | Age: 55
End: 2020-02-14
Payer: MEDICARE

## 2020-02-14 VITALS
BODY MASS INDEX: 28.29 KG/M2 | SYSTOLIC BLOOD PRESSURE: 125 MMHG | WEIGHT: 191 LBS | HEIGHT: 69 IN | HEART RATE: 81 BPM | DIASTOLIC BLOOD PRESSURE: 80 MMHG

## 2020-02-14 PROCEDURE — 73562 X-RAY EXAM OF KNEE 3: CPT | Mod: LT

## 2020-02-14 PROCEDURE — 99204 OFFICE O/P NEW MOD 45 MIN: CPT

## 2020-02-14 NOTE — HISTORY OF PRESENT ILLNESS
[de-identified] : Patient is a 54 or one male who presents today for evaluation of his left knee. He states he's had some discomfort over the past several weeks/months. Denies any history of injury or accident. Patient originally had surgery in his knee several times and do to a tibial plateau fracture her for which ultimately led him to knee replacements. Following his knee replacement with Dr. Caputo. Patient states that he was doing very well with no complaints. He states that he was able to return back to his activities and was doing very well so recently. He states that as his note some mild discomfort in his knee this is primarily with standing and walking. He states that he did go to physical therapy for it was explained that he may want at [facility]. He was explained to follow up with his orthopedic surgeon for reevaluation. He states that this pain is on and off primarily occurring only with any strenuous activities. [Walking] : walking [Stable] : stable [Standing] : standing

## 2020-02-14 NOTE — PHYSICAL EXAM
[Normal] : Gait: normal [LE] : Sensory: Intact in bilateral lower extremities [de-identified] : On physical examination the left knee. Patient is status post left total knee replacement. There is an old well-healed surgical scar with no evidence of infection superficial or deep. There is no redness swelling or discharge noted. There is some mild diffuse tenderness. The overall alignment is with the neutral position. There is no evidence of muscle atrophy. He has excellent range of motion as he is able to fully extend and he can flex the knee toward 125° plus. There is no evidence of ligamentous laxity. However there is a slight click with varus and valgus stress test as well as anterior drawer test. Indicative of slight poly-wear. There is no evidence of any muscle atrophy. No evidence of any motor or sensory deficit. Patient does have good distal pulses with no calf tenderness. [de-identified] : X-rays of the left knee reveals status post left total replacement. The hardware is in place and shows excellent as well as fixation. There is no evidence of any fracture dislocation or loosening of hardware. [ALL] : dorsalis pedis, posterior tibial, femoral, popliteal, and radial 2+ and symmetric bilaterally

## 2020-02-14 NOTE — DISCUSSION/SUMMARY
[de-identified] : Plan the patient is to continue with the present level of activities. This includes a good home exercise program as well as ice pack/moist heat. He was assured that the x-rays are doing well and he is doing very well following his surgeries. However there is some slight poly-wear. As this may become problematic in the near future. It is suggested that he continue with a good exercise program at the time followup anti-inflammatories as needed. Patient is suggested to return back to the office in another year for reevaluation of management.

## 2020-02-28 ENCOUNTER — EMERGENCY (EMERGENCY)
Facility: HOSPITAL | Age: 55
LOS: 1 days | Discharge: HOME | End: 2020-02-28
Attending: EMERGENCY MEDICINE
Payer: MEDICARE

## 2020-02-28 VITALS
RESPIRATION RATE: 18 BRPM | HEIGHT: 69 IN | HEART RATE: 88 BPM | SYSTOLIC BLOOD PRESSURE: 153 MMHG | WEIGHT: 184.97 LBS | TEMPERATURE: 98 F | DIASTOLIC BLOOD PRESSURE: 81 MMHG | OXYGEN SATURATION: 100 %

## 2020-02-28 DIAGNOSIS — Z98.89 OTHER SPECIFIED POSTPROCEDURAL STATES: Chronic | ICD-10-CM

## 2020-02-28 DIAGNOSIS — Z98.890 OTHER SPECIFIED POSTPROCEDURAL STATES: Chronic | ICD-10-CM

## 2020-02-28 PROCEDURE — 29125 APPL SHORT ARM SPLINT STATIC: CPT

## 2020-02-28 PROCEDURE — 99284 EMERGENCY DEPT VISIT MOD MDM: CPT | Mod: 25

## 2020-02-28 PROCEDURE — 73130 X-RAY EXAM OF HAND: CPT

## 2020-02-28 PROCEDURE — 99283 EMERGENCY DEPT VISIT LOW MDM: CPT | Mod: 25

## 2020-02-28 PROCEDURE — 73130 X-RAY EXAM OF HAND: CPT | Mod: 26,RT

## 2020-02-28 PROCEDURE — 29125 APPL SHORT ARM SPLINT STATIC: CPT | Mod: RT

## 2020-02-28 NOTE — ED PROVIDER NOTE - MUSCULOSKELETAL, MLM
Right hand: Old healed surgical scars over 4th and 5th metacarpals; mild swelling to 4th and 5th metacarpal, slight deformity noted, mild tenderness to palpation; slightly decreased ROM of 4th, 5th digits; distal fingers are NVI, cap refill under 2 seconds; reports dull numbness to metacarpal region; rest of hand and wrist with full range of motion, no pain or deformity

## 2020-02-28 NOTE — ED PROVIDER NOTE - CARE PROVIDER_API CALL
Michael Chapman)  Orthopaedic Surgery  [address] [place] [address]  Phone: [phone]  Fax: [phone]  Follow Up Time: 4-6 Days
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
## 2020-02-28 NOTE — ED PROVIDER NOTE - PATIENT PORTAL LINK FT
You can access the FollowMyHealth Patient Portal offered by [facility] by registering at the following website: [email or web address]. By joining [employer]’s FollowMyHealth portal, you will also be able to view your health information using other applications (apps) compatible with our system.
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
## 2020-02-28 NOTE — ED PROVIDER NOTE - SKIN, MLM
Skin normal color for race, warm, dry and intact. No evidence of rash. no bruising to the right hand

## 2020-04-07 ENCOUNTER — APPOINTMENT (OUTPATIENT)
Dept: GASTROENTEROLOGY | Facility: CLINIC | Age: 55
End: 2020-04-07

## 2020-05-13 ENCOUNTER — APPOINTMENT (OUTPATIENT)
Dept: GASTROENTEROLOGY | Facility: CLINIC | Age: 55
End: 2020-05-13
Payer: MEDICARE

## 2020-05-13 PROCEDURE — 99441: CPT | Mod: CR

## 2020-08-03 ENCOUNTER — APPOINTMENT (OUTPATIENT)
Dept: GASTROENTEROLOGY | Facility: CLINIC | Age: 55
End: 2020-08-03

## 2020-11-13 ENCOUNTER — APPOINTMENT (OUTPATIENT)
Dept: ORTHOPEDIC SURGERY | Facility: CLINIC | Age: 55
End: 2020-11-13

## 2020-11-18 ENCOUNTER — NON-APPOINTMENT (OUTPATIENT)
Age: 55
End: 2020-11-18

## 2020-11-20 ENCOUNTER — NON-APPOINTMENT (OUTPATIENT)
Age: 55
End: 2020-11-20

## 2020-12-04 ENCOUNTER — APPOINTMENT (OUTPATIENT)
Dept: GASTROENTEROLOGY | Facility: CLINIC | Age: 55
End: 2020-12-04
Payer: MEDICARE

## 2020-12-04 VITALS
SYSTOLIC BLOOD PRESSURE: 154 MMHG | DIASTOLIC BLOOD PRESSURE: 103 MMHG | RESPIRATION RATE: 15 BRPM | TEMPERATURE: 98.1 F | WEIGHT: 180 LBS | HEIGHT: 69 IN | OXYGEN SATURATION: 97 % | HEART RATE: 86 BPM | BODY MASS INDEX: 26.66 KG/M2

## 2020-12-04 DIAGNOSIS — R10.9 UNSPECIFIED ABDOMINAL PAIN: ICD-10-CM

## 2020-12-04 DIAGNOSIS — R11.14 BILIOUS VOMITING: ICD-10-CM

## 2020-12-04 DIAGNOSIS — R10.13 EPIGASTRIC PAIN: ICD-10-CM

## 2020-12-04 PROCEDURE — 99214 OFFICE O/P EST MOD 30 MIN: CPT

## 2020-12-04 RX ORDER — SUCRALFATE 1 G/1
TABLET ORAL
Refills: 0 | Status: ACTIVE | COMMUNITY

## 2020-12-04 RX ORDER — PANTOPRAZOLE 40 MG/1
40 TABLET, DELAYED RELEASE ORAL
Qty: 90 | Refills: 1 | Status: ACTIVE | COMMUNITY
Start: 2020-05-13

## 2020-12-04 RX ORDER — HYDROCODONE/ACETAMINOPHEN 10MG-500MG
10-500 TABLET ORAL
Refills: 0 | Status: ACTIVE | COMMUNITY

## 2020-12-04 RX ORDER — DIVALPROEX SODIUM 500 MG/1
500 TABLET, DELAYED RELEASE ORAL
Refills: 0 | Status: ACTIVE | COMMUNITY

## 2020-12-04 RX ORDER — AMLODIPINE BESYLATE 10 MG/1
10 TABLET ORAL
Refills: 0 | Status: ACTIVE | COMMUNITY

## 2020-12-05 NOTE — HISTORY OF PRESENT ILLNESS
[FreeTextEntry1] : 55-year-old  male with history of hypertension hyperlipidemia mild diabetes and bipolar disorder; by history, prior normal colonoscopy and endoscopy in 2018  at [facility].\par  Patient has distantly sustained a significant intentional weight loss from 300-175. A fairly recent nuclear medicine gastric emptying study was normal.   He was most recently hospitalized at [facility]  from 11/16 through 11/21/20,  for evaluation of recurrent left upper quadrant abdominal pain. Supposedly blood work and CAT scan were normal. Official reports not available. An upper endoscopy was performed at [facility] on 11/20. Allegedly normal and  biopsies were obtained. Discharged the following day on Carafate and pantoprazole.\giovanni Still notes intermittent left upper quadrant abdominal pain and a.m. nausea. No history of alcohol abuse or pancreatitis. No further weight loss. No fever. No GI bleeding. No diarrhea or constipation. Overall better but not completely resolved.

## 2020-12-05 NOTE — PHYSICAL EXAM
[Sclera] : the sclera and conjunctiva were normal [PERRL With Normal Accommodation] : pupils were equal in size, round, and reactive to light [Extraocular Movements] : extraocular movements were intact [Outer Ear] : the ears and nose were normal in appearance [Oropharynx] : the oropharynx was normal [Neck Appearance] : the appearance of the neck was normal [Neck Cervical Mass (___cm)] : no neck mass was observed [Jugular Venous Distention Increased] : there was no jugular-venous distention [Thyroid Diffuse Enlargement] : the thyroid was not enlarged [Thyroid Nodule] : there were no palpable thyroid nodules [Auscultation Breath Sounds / Voice Sounds] : lungs were clear to auscultation bilaterally [Heart Rate And Rhythm] : heart rate was normal and rhythm regular [Heart Sounds] : normal S1 and S2 [Heart Sounds Gallop] : no gallops [Murmurs] : no murmurs [Heart Sounds Pericardial Friction Rub] : no pericardial rub [Bowel Sounds] : normal bowel sounds [Abdomen Soft] : soft [Abdomen Tenderness] : non-tender [] : no hepato-splenomegaly [Abdomen Mass (___ Cm)] : no abdominal mass palpated [Normal Sphincter Tone] : normal sphincter tone [No Rectal Mass] : no rectal mass [Internal Hemorrhoid] : no internal hemorrhoids [External Hemorrhoid] : no external hemorrhoids [Occult Blood Positive] : stool was negative for occult blood [No CVA Tenderness] : no ~M costovertebral angle tenderness [No Spinal Tenderness] : no spinal tenderness [Abnormal Walk] : normal gait [Nail Clubbing] : no clubbing  or cyanosis of the fingernails [Motor Tone] : muscle strength and tone were normal [Musculoskeletal - Swelling] : no joint swelling seen

## 2020-12-05 NOTE — ASSESSMENT
[FreeTextEntry1] : Unclear etiology of recurrent left upper quadrant abdominal pain. Record release for the recent hospital stay at [facility] to includes blood work CAT scan endoscopy and pathology reports. Presumption of no colonic pathology given the recent history of a negative colonoscopy. Official report unavailable.. \par Advised to continue with double therapy with Carafate and pantoprazole 4 months. Then discontinue Carafate and continue the pantoprazole. GI office follow up here in 3 months or sooner if need be.

## 2020-12-05 NOTE — REASON FOR VISIT
[Post Hospitalization] : a post hospitalization visit [FreeTextEntry1] : Left upper quadrant abdominal pain unknown etiology.

## 2020-12-11 ENCOUNTER — APPOINTMENT (OUTPATIENT)
Dept: ORTHOPEDIC SURGERY | Facility: CLINIC | Age: 55
End: 2020-12-11
Payer: MEDICARE

## 2020-12-11 VITALS
DIASTOLIC BLOOD PRESSURE: 98 MMHG | WEIGHT: 180 LBS | BODY MASS INDEX: 26.66 KG/M2 | SYSTOLIC BLOOD PRESSURE: 148 MMHG | HEART RATE: 111 BPM | HEIGHT: 69 IN

## 2020-12-11 DIAGNOSIS — T84.018A BROKEN INTERNAL JOINT PROSTHESIS, OTHER SITE, INITIAL ENCOUNTER: ICD-10-CM

## 2020-12-11 DIAGNOSIS — G89.29 OTHER CHRONIC PAIN: ICD-10-CM

## 2020-12-11 DIAGNOSIS — Z96.659 BROKEN INTERNAL JOINT PROSTHESIS, OTHER SITE, INITIAL ENCOUNTER: ICD-10-CM

## 2020-12-11 PROCEDURE — 73562 X-RAY EXAM OF KNEE 3: CPT | Mod: LT

## 2020-12-11 PROCEDURE — 99214 OFFICE O/P EST MOD 30 MIN: CPT

## 2020-12-11 NOTE — HISTORY OF PRESENT ILLNESS
[de-identified] : Patient returns today for evaluation of a revision left total knee arthroplasty. The patient's last November. He was instructed to maintain followup because of polyethylene wear. He believes that his knee hyperextends at times. He has chronic lateral knee pain. He reports of at least 5 past knee surgeries. No recent trauma or changes to his chronic lateral knee pain reported. He does take daily Vicodin for chronic pain control. He is not currently using a cane or a walker. He is not currently using a brace. No other related complaints.\par \par Review of Systems-\par Constitutional: No fever or chills. \par Cardiovascular: No orthopnea or chest pain\par Pulmonary: No shortness of breath. \par GI: No nausea or vomiting or abdominal pain.\par Musculoskeletal: see HPI \par Psychiatric: No anxiety and depression.

## 2020-12-11 NOTE — DISCUSSION/SUMMARY
[Surgical risks reviewed] : Surgical risks reviewed [de-identified] : X-rays reviewed with the patient. The patient is clinically symptomatic as a result of the polyethylene wear. There is increased laxity the knee. He is indicated for a revision left knee arthroplasty with polyethylene exchange. The patient was explained the primary objective of the surgery is to exchange the polyethylene bearing. If the femoral or tibial components were noted to be loose, he would be indicated for revision arthroplasty of those implants. This we decided time of surgery. The patient demonstrated his understanding of the uncertainty of the nature of the surgery. He will need medical cardiac clearance. He will contact the office to arrange a surgical date. He will contact the office should you've any further questions regarding the proposed surgery.

## 2020-12-11 NOTE — PHYSICAL EXAM
[de-identified] : The patient appears well nourished and in no apparent distress. The patient is alert and oriented to person, place, and time. Affect and mood appear normal. The head is normocephalic and atraumatic. Skin shows normal turgor with no evidence of eczema or psoriasis. No respiratory distress noted. Sensation grossly intact. MUSCULOSKELETAL:   SEE BELOW\par \par Left knee exam demonstrates well-healed surgical incisions. No acute effusion. Normal temperature. No erythema or ecchymosis. No signs of acute trauma. There is diminished sensation of the lateral left knee. Medial sensation is normal. There is full extension to 0°. No hyperextension is appreciated. There is some laxity with medial and lateral stress in partial flexion. There is also some mild laxity with anterior stress testing. No calf tenderness. No distal swelling is appreciated. [de-identified] : X-rays of the left knee were reviewed. Polyethylene wear is noted medially. There is a stem tibia. No loosening of the implants is appreciated.

## 2020-12-29 ENCOUNTER — NON-APPOINTMENT (OUTPATIENT)
Age: 55
End: 2020-12-29

## 2020-12-31 ENCOUNTER — OUTPATIENT (OUTPATIENT)
Dept: OUTPATIENT SERVICES | Facility: HOSPITAL | Age: 55
LOS: 1 days | End: 2020-12-31
Payer: MEDICARE

## 2020-12-31 VITALS
HEART RATE: 81 BPM | RESPIRATION RATE: 20 BRPM | HEIGHT: 69 IN | TEMPERATURE: 97 F | SYSTOLIC BLOOD PRESSURE: 120 MMHG | DIASTOLIC BLOOD PRESSURE: 80 MMHG | WEIGHT: 186.73 LBS

## 2020-12-31 DIAGNOSIS — T84.018A BROKEN INTERNAL JOINT PROSTHESIS, OTHER SITE, INITIAL ENCOUNTER: ICD-10-CM

## 2020-12-31 DIAGNOSIS — I10 ESSENTIAL (PRIMARY) HYPERTENSION: ICD-10-CM

## 2020-12-31 DIAGNOSIS — Z98.890 OTHER SPECIFIED POSTPROCEDURAL STATES: Chronic | ICD-10-CM

## 2020-12-31 DIAGNOSIS — Z98.89 OTHER SPECIFIED POSTPROCEDURAL STATES: Chronic | ICD-10-CM

## 2020-12-31 DIAGNOSIS — Z29.9 ENCOUNTER FOR PROPHYLACTIC MEASURES, UNSPECIFIED: ICD-10-CM

## 2020-12-31 DIAGNOSIS — Z01.818 ENCOUNTER FOR OTHER PREPROCEDURAL EXAMINATION: ICD-10-CM

## 2020-12-31 DIAGNOSIS — Z96.652 PRESENCE OF LEFT ARTIFICIAL KNEE JOINT: Chronic | ICD-10-CM

## 2020-12-31 LAB
A1C WITH ESTIMATED AVERAGE GLUCOSE RESULT: 6 % — HIGH (ref 4–5.6)
ALBUMIN SERPL ELPH-MCNC: 4.2 G/DL — SIGNIFICANT CHANGE UP (ref 3.3–5.2)
ALP SERPL-CCNC: 48 U/L — SIGNIFICANT CHANGE UP (ref 40–120)
ALT FLD-CCNC: 27 U/L — SIGNIFICANT CHANGE UP
ANION GAP SERPL CALC-SCNC: 12 MMOL/L — SIGNIFICANT CHANGE UP (ref 5–17)
APTT BLD: 35.1 SEC — SIGNIFICANT CHANGE UP (ref 27.5–35.5)
AST SERPL-CCNC: 17 U/L — SIGNIFICANT CHANGE UP
BASOPHILS # BLD AUTO: 0.05 K/UL — SIGNIFICANT CHANGE UP (ref 0–0.2)
BASOPHILS NFR BLD AUTO: 0.9 % — SIGNIFICANT CHANGE UP (ref 0–2)
BILIRUB SERPL-MCNC: <0.2 MG/DL — LOW (ref 0.4–2)
BLD GP AB SCN SERPL QL: SIGNIFICANT CHANGE UP
BUN SERPL-MCNC: 7 MG/DL — LOW (ref 8–20)
CALCIUM SERPL-MCNC: 9.4 MG/DL — SIGNIFICANT CHANGE UP (ref 8.6–10.2)
CHLORIDE SERPL-SCNC: 103 MMOL/L — SIGNIFICANT CHANGE UP (ref 98–107)
CO2 SERPL-SCNC: 24 MMOL/L — SIGNIFICANT CHANGE UP (ref 22–29)
CREAT SERPL-MCNC: 0.74 MG/DL — SIGNIFICANT CHANGE UP (ref 0.5–1.3)
EOSINOPHIL # BLD AUTO: 0.03 K/UL — SIGNIFICANT CHANGE UP (ref 0–0.5)
EOSINOPHIL NFR BLD AUTO: 0.6 % — SIGNIFICANT CHANGE UP (ref 0–6)
ESTIMATED AVERAGE GLUCOSE: 126 MG/DL — HIGH (ref 68–114)
GLUCOSE SERPL-MCNC: 114 MG/DL — HIGH (ref 70–99)
HCT VFR BLD CALC: 41.7 % — SIGNIFICANT CHANGE UP (ref 39–50)
HGB BLD-MCNC: 13.8 G/DL — SIGNIFICANT CHANGE UP (ref 13–17)
IMM GRANULOCYTES NFR BLD AUTO: 0.4 % — SIGNIFICANT CHANGE UP (ref 0–1.5)
INR BLD: 0.95 RATIO — SIGNIFICANT CHANGE UP (ref 0.88–1.16)
LYMPHOCYTES # BLD AUTO: 1.85 K/UL — SIGNIFICANT CHANGE UP (ref 1–3.3)
LYMPHOCYTES # BLD AUTO: 34.6 % — SIGNIFICANT CHANGE UP (ref 13–44)
MCHC RBC-ENTMCNC: 30.6 PG — SIGNIFICANT CHANGE UP (ref 27–34)
MCHC RBC-ENTMCNC: 33.1 GM/DL — SIGNIFICANT CHANGE UP (ref 32–36)
MCV RBC AUTO: 92.5 FL — SIGNIFICANT CHANGE UP (ref 80–100)
MONOCYTES # BLD AUTO: 0.52 K/UL — SIGNIFICANT CHANGE UP (ref 0–0.9)
MONOCYTES NFR BLD AUTO: 9.7 % — SIGNIFICANT CHANGE UP (ref 2–14)
MRSA PCR RESULT.: SIGNIFICANT CHANGE UP
NEUTROPHILS # BLD AUTO: 2.87 K/UL — SIGNIFICANT CHANGE UP (ref 1.8–7.4)
NEUTROPHILS NFR BLD AUTO: 53.8 % — SIGNIFICANT CHANGE UP (ref 43–77)
PLATELET # BLD AUTO: 289 K/UL — SIGNIFICANT CHANGE UP (ref 150–400)
POTASSIUM SERPL-MCNC: 3.7 MMOL/L — SIGNIFICANT CHANGE UP (ref 3.5–5.3)
POTASSIUM SERPL-SCNC: 3.7 MMOL/L — SIGNIFICANT CHANGE UP (ref 3.5–5.3)
PROT SERPL-MCNC: 6.8 G/DL — SIGNIFICANT CHANGE UP (ref 6.6–8.7)
PROTHROM AB SERPL-ACNC: 11.1 SEC — SIGNIFICANT CHANGE UP (ref 10.6–13.6)
RBC # BLD: 4.51 M/UL — SIGNIFICANT CHANGE UP (ref 4.2–5.8)
RBC # FLD: 13.2 % — SIGNIFICANT CHANGE UP (ref 10.3–14.5)
S AUREUS DNA NOSE QL NAA+PROBE: SIGNIFICANT CHANGE UP
SODIUM SERPL-SCNC: 139 MMOL/L — SIGNIFICANT CHANGE UP (ref 135–145)
WBC # BLD: 5.34 K/UL — SIGNIFICANT CHANGE UP (ref 3.8–10.5)
WBC # FLD AUTO: 5.34 K/UL — SIGNIFICANT CHANGE UP (ref 3.8–10.5)

## 2020-12-31 PROCEDURE — G0463: CPT

## 2020-12-31 PROCEDURE — 93005 ELECTROCARDIOGRAM TRACING: CPT

## 2020-12-31 PROCEDURE — 93010 ELECTROCARDIOGRAM REPORT: CPT

## 2020-12-31 RX ORDER — AMLODIPINE BESYLATE 2.5 MG/1
1 TABLET ORAL
Qty: 0 | Refills: 0 | DISCHARGE

## 2020-12-31 NOTE — H&P PST ADULT - HISTORY OF PRESENT ILLNESS
55 years old male with PMH of Marijuana Use, DM (Diet controlled), HTN, HLD (Diet Controlled), Asthma and Bipolar Disorder comes to PST with c/o left leg pain.  55 years old male with PMH DM (Diet controlled), HTN, HLD (Diet Controlled), Asthma and Bipolar Disorder comes to PST with c/o left leg pain.   He has chronic lateral knee pain. He reports a history of at least 5 past knee surgeries. Initial injury was due to a trauma. Patient reports recent work up revealed a failed hardware.  No recent trauma or changes to his chronic lateral knee pain reported.  He is followed by pain management by Dr. Oj bravo [phone]

## 2020-12-31 NOTE — PATIENT PROFILE ADULT - NSPREOP1_ABLETOREACHPT_GEN_A_NUR
[phone]    denies domestic or international travel in the past 3 weeks [phone]    denies domestic or international travel in the past 3 weeks/yes

## 2020-12-31 NOTE — H&P PST ADULT - NSICDXPROBLEM_GEN_ALL_CORE_FT
PROBLEM DIAGNOSES  Problem: Failed total knee arthroplasty  Assessment and Plan: left total knee revision arthroplasty and poly exchange     Problem: HTN (hypertension)  Assessment and Plan: routine labs and ekg  continue medication as directed  medical clearance with KHADIJAH Shah    Problem: Need for prophylactic measure  Assessment and Plan: High risk,  Surgical team should assess /Strongly recommend pharmacological and mechanical measures for VTE prophylaxis

## 2020-12-31 NOTE — H&P PST ADULT - NSICDXPASTSURGICALHX_GEN_ALL_CORE_FT
PAST SURGICAL HISTORY:  H/O colonoscopy     H/O endoscopy     History of removal of joint prosthesis of left knee due to infection     S/P hernia surgery     S/P knee surgery     S/P revision of total knee, left

## 2020-12-31 NOTE — H&P PST ADULT - MUSCULOSKELETAL
detailed exam details… no calf tenderness/decreased ROM/decreased ROM due to pain/diminished strength

## 2020-12-31 NOTE — H&P PST ADULT - NSICDXPASTMEDICALHX_GEN_ALL_CORE_FT
PAST MEDICAL HISTORY:  Asthma     Bipolar disorder     Chronic back pain     Chronic pain of left knee     Diabetes     Gastritis     HLD (hyperlipidemia)     HTN (hypertension)      PAST MEDICAL HISTORY:  Asthma     Bipolar disorder     Chronic back pain     Chronic pain of left knee     Diabetes     Gastritis     History of Helicobacter pylori infection     HLD (hyperlipidemia)     HNP (herniated nucleus pulposus), lumbar     HTN (hypertension)

## 2020-12-31 NOTE — PATIENT PROFILE ADULT - NSPROHMSYMPCOND_GEN_A_NUR
pre op teaching surgical scrub pain management instructions given to pt    covid swab to be done Jan 11/none

## 2020-12-31 NOTE — H&P PST ADULT - ASSESSMENT
OPIOID RISK TOOL    BETH EACH BOX THAT APPLIES AND ADD TOTALS AT THE END    FAMILY HISTORY OF SUBSTANCE ABUSE                 FEMALE         MALE                                                Alcohol                             [  ]1 pt          [  ]3pts                                               Illegal Durgs                     [  ]2 pts        [  ]3pts                                               Rx Drugs                           [  ]4 pts        [  ]4 pts    PERSONAL HISTORY OF SUBSTANCE ABUSE                                                                                          Alcohol                             [  ]3 pts       [  ]3 pts                                               Illegal Drugs                     [  ]4 pts        [  ]4 pts                                               Rx Drugs                           [  ]5 pts        [  ]5 pts    AGE BETWEEN 16-45 YEARS                                      [  ]1 pt         [  ]1 pt    HISTORY OF PREADOLESCENT   SEXUAL ABUSE                                                             [  ]3 pts        [  ]0pts    PSYCHOLOGICAL DISEASE                     ADD, OCD, Bipolar, Schizophrenia        [  ]2 pts         [x  ]2 pts                      Depression                                               [  ]1 pt           [  ]1 pt           SCORING TOTAL   (add numbers and type here)              (*2)                                     A score of 3 or lower indicated LOW risk for future opioid abuse  A score of 4 to 7 indicated moderate risk for future opioid abuse  A score of 8 or higher indicates a high risk for opioid abuseCAPRINI SCORE [CLOT]    AGE RELATED RISK FACTORS                                                       MOBILITY RELATED FACTORS  [x ] Age 41-60 years                                            (1 Point)                  [ ] Bed rest                                                        (1 Point)  [ ] Age: 61-74 years                                           (2 Points)                 [ ] Plaster cast                                                   (2 Points)  [ ] Age= 75 years                                              (3 Points)                 [ ] Bed bound for more than 72 hours                 (2 Points)    DISEASE RELATED RISK FACTORS                                               GENDER SPECIFIC FACTORS  [ ] Edema in the lower extremities                       (1 Point)                  [ ] Pregnancy                                                     (1 Point)  [ ] Varicose veins                                               (1 Point)                  [ ] Post-partum < 6 weeks                                   (1 Point)             [x ] BMI > 25 Kg/m2                                            (1 Point)                  [ ] Hormonal therapy  or oral contraception          (1 Point)                 [ ] Sepsis (in the previous month)                        (1 Point)                  [ ] History of pregnancy complications                 (1 point)  [ ] Pneumonia or serious lung disease                                               [ ] Unexplained or recurrent                     (1 Point)           (in the previous month)                               (1 Point)  [ ] Abnormal pulmonary function test                     (1 Point)                 SURGERY RELATED RISK FACTORS  [ ] Acute myocardial infarction                              (1 Point)                 [ ]  Section                                             (1 Point)  [ ] Congestive heart failure (in the previous month)  (1 Point)               [ ] Minor surgery                                                  (1 Point)   [ ] Inflammatory bowel disease                             (1 Point)                 [ ] Arthroscopic surgery                                        (2 Points)  [ ] Central venous access                                      (2 Points)                [ ] General surgery lasting more than 45 minutes   (2 Points)       [ ] Stroke (in the previous month)                          (5 Points)               [ x] Elective arthroplasty                                         (5 Points)                                                                                                                                               HEMATOLOGY RELATED FACTORS                                                 TRAUMA RELATED RISK FACTORS  [ ] Prior episodes of VTE                                     (3 Points)                [ ] Fracture of the hip, pelvis, or leg                       (5 Points)  [ ] Positive family history for VTE                         (3 Points)                 [ ] Acute spinal cord injury (in the previous month)  (5 Points)  [ ] Prothrombin 54479 A                                     (3 Points)                 [ ] Paralysis  (less than 1 month)                             (5 Points)  [ ] Factor V Leiden                                             (3 Points)                  [ ] Multiple Trauma within 1 month                        (5 Points)  [ ] Lupus anticoagulants                                     (3 Points)                                                           [ ] Anticardiolipin antibodies                               (3 Points)                                                       [ ] High homocysteine in the blood                      (3 Points)                                             [ ] Other congenital or acquired thrombophilia      (3 Points)                                                [ ] Heparin induced thrombocytopenia                  (3 Points)                                          Total Score [  7   ]    Caprini Score 0 - 2:  Low Risk, No VTE Prophylaxis required for most patients, encourage ambulation  Caprini Score 3 - 6:  At Risk, pharmacologic VTE prophylaxis is indicated for most patients (in the absence of a contraindication)  Caprini Score Greater than or = 7:  High Risk, pharmacologic VTE prophylaxis is indicated for most patients (in the absence of a contraindication)    55 year old male with chronic knee pain present to pst with failed joint prosthesis and now is schedule for left knee revision arthroplasty and poly exchange.   Patient educated on written and verbal pre op instructions.  Pt instructed to stop ASA/Herbals or anti-inflamatory meds one week prior to surgery and discuss with PMD

## 2021-01-01 PROBLEM — M51.26 OTHER INTERVERTEBRAL DISC DISPLACEMENT, LUMBAR REGION: Chronic | Status: ACTIVE | Noted: 2020-12-31

## 2021-01-01 PROBLEM — Z86.19 PERSONAL HISTORY OF OTHER INFECTIOUS AND PARASITIC DISEASES: Chronic | Status: ACTIVE | Noted: 2020-12-31

## 2021-01-10 DIAGNOSIS — Z01.818 ENCOUNTER FOR OTHER PREPROCEDURAL EXAMINATION: ICD-10-CM

## 2021-01-11 ENCOUNTER — APPOINTMENT (OUTPATIENT)
Dept: DISASTER EMERGENCY | Facility: CLINIC | Age: 56
End: 2021-01-11

## 2021-01-13 ENCOUNTER — TRANSCRIPTION ENCOUNTER (OUTPATIENT)
Age: 56
End: 2021-01-13

## 2021-01-13 LAB — SARS-COV-2 N GENE NPH QL NAA+PROBE: NOT DETECTED

## 2021-01-14 ENCOUNTER — RESULT REVIEW (OUTPATIENT)
Age: 56
End: 2021-01-14

## 2021-01-14 ENCOUNTER — INPATIENT (INPATIENT)
Facility: HOSPITAL | Age: 56
LOS: 1 days | Discharge: HOME | DRG: 465 | End: 2021-01-16
Attending: ORTHOPAEDIC SURGERY | Admitting: ORTHOPAEDIC SURGERY
Payer: MEDICARE

## 2021-01-14 ENCOUNTER — APPOINTMENT (OUTPATIENT)
Dept: ORTHOPEDIC SURGERY | Facility: HOSPITAL | Age: 56
End: 2021-01-14

## 2021-01-14 VITALS
RESPIRATION RATE: 16 BRPM | TEMPERATURE: 98 F | DIASTOLIC BLOOD PRESSURE: 99 MMHG | WEIGHT: 186.73 LBS | OXYGEN SATURATION: 97 % | HEIGHT: 69 IN | SYSTOLIC BLOOD PRESSURE: 137 MMHG | HEART RATE: 88 BPM

## 2021-01-14 DIAGNOSIS — Z98.890 OTHER SPECIFIED POSTPROCEDURAL STATES: Chronic | ICD-10-CM

## 2021-01-14 DIAGNOSIS — E11.9 TYPE 2 DIABETES MELLITUS WITHOUT COMPLICATIONS: ICD-10-CM

## 2021-01-14 DIAGNOSIS — Z96.652 PRESENCE OF LEFT ARTIFICIAL KNEE JOINT: ICD-10-CM

## 2021-01-14 DIAGNOSIS — Z98.89 OTHER SPECIFIED POSTPROCEDURAL STATES: Chronic | ICD-10-CM

## 2021-01-14 DIAGNOSIS — I10 ESSENTIAL (PRIMARY) HYPERTENSION: ICD-10-CM

## 2021-01-14 DIAGNOSIS — K29.70 GASTRITIS, UNSPECIFIED, WITHOUT BLEEDING: ICD-10-CM

## 2021-01-14 DIAGNOSIS — F31.9 BIPOLAR DISORDER, UNSPECIFIED: ICD-10-CM

## 2021-01-14 DIAGNOSIS — Z96.652 PRESENCE OF LEFT ARTIFICIAL KNEE JOINT: Chronic | ICD-10-CM

## 2021-01-14 DIAGNOSIS — T84.018A BROKEN INTERNAL JOINT PROSTHESIS, OTHER SITE, INITIAL ENCOUNTER: ICD-10-CM

## 2021-01-14 DIAGNOSIS — J45.909 UNSPECIFIED ASTHMA, UNCOMPLICATED: ICD-10-CM

## 2021-01-14 DIAGNOSIS — E78.5 HYPERLIPIDEMIA, UNSPECIFIED: ICD-10-CM

## 2021-01-14 LAB
GLUCOSE BLDC GLUCOMTR-MCNC: 106 MG/DL — HIGH (ref 70–99)
GLUCOSE BLDC GLUCOMTR-MCNC: 117 MG/DL — HIGH (ref 70–99)
GLUCOSE BLDC GLUCOMTR-MCNC: 85 MG/DL — SIGNIFICANT CHANGE UP (ref 70–99)

## 2021-01-14 PROCEDURE — 73560 X-RAY EXAM OF KNEE 1 OR 2: CPT | Mod: 26,LT

## 2021-01-14 PROCEDURE — 99222 1ST HOSP IP/OBS MODERATE 55: CPT

## 2021-01-14 PROCEDURE — 27487 REVISE/REPLACE KNEE JOINT: CPT | Mod: LT

## 2021-01-14 PROCEDURE — 88311 DECALCIFY TISSUE: CPT | Mod: 26

## 2021-01-14 PROCEDURE — 88305 TISSUE EXAM BY PATHOLOGIST: CPT | Mod: 26

## 2021-01-14 PROCEDURE — 27487 REVISE/REPLACE KNEE JOINT: CPT | Mod: AS,LT

## 2021-01-14 RX ORDER — DIVALPROEX SODIUM 500 MG/1
500 TABLET, DELAYED RELEASE ORAL DAILY
Refills: 0 | Status: DISCONTINUED | OUTPATIENT
Start: 2021-01-14 | End: 2021-01-16

## 2021-01-14 RX ORDER — CELECOXIB 200 MG/1
200 CAPSULE ORAL EVERY 12 HOURS
Refills: 0 | Status: DISCONTINUED | OUTPATIENT
Start: 2021-01-15 | End: 2021-01-16

## 2021-01-14 RX ORDER — CEPHALEXIN 500 MG
500 CAPSULE ORAL
Refills: 0 | Status: DISCONTINUED | OUTPATIENT
Start: 2021-01-14 | End: 2021-01-16

## 2021-01-14 RX ORDER — SODIUM CHLORIDE 9 MG/ML
1000 INJECTION, SOLUTION INTRAVENOUS
Refills: 0 | Status: DISCONTINUED | OUTPATIENT
Start: 2021-01-14 | End: 2021-01-14

## 2021-01-14 RX ORDER — CEFAZOLIN SODIUM 1 G
2000 VIAL (EA) INJECTION ONCE
Refills: 0 | Status: COMPLETED | OUTPATIENT
Start: 2021-01-14 | End: 2021-01-14

## 2021-01-14 RX ORDER — HYDROMORPHONE HYDROCHLORIDE 2 MG/ML
0.5 INJECTION INTRAMUSCULAR; INTRAVENOUS; SUBCUTANEOUS ONCE
Refills: 0 | Status: DISCONTINUED | OUTPATIENT
Start: 2021-01-14 | End: 2021-01-16

## 2021-01-14 RX ORDER — OXYCODONE HYDROCHLORIDE 5 MG/1
15 TABLET ORAL
Refills: 0 | Status: DISCONTINUED | OUTPATIENT
Start: 2021-01-14 | End: 2021-01-16

## 2021-01-14 RX ORDER — PANTOPRAZOLE SODIUM 20 MG/1
40 TABLET, DELAYED RELEASE ORAL
Refills: 0 | Status: DISCONTINUED | OUTPATIENT
Start: 2021-01-14 | End: 2021-01-16

## 2021-01-14 RX ORDER — SODIUM CHLORIDE 9 MG/ML
500 INJECTION INTRAMUSCULAR; INTRAVENOUS; SUBCUTANEOUS ONCE
Refills: 0 | Status: COMPLETED | OUTPATIENT
Start: 2021-01-14 | End: 2021-01-14

## 2021-01-14 RX ORDER — OXYCODONE HYDROCHLORIDE 5 MG/1
10 TABLET ORAL
Refills: 0 | Status: DISCONTINUED | OUTPATIENT
Start: 2021-01-14 | End: 2021-01-16

## 2021-01-14 RX ORDER — SODIUM CHLORIDE 9 MG/ML
1000 INJECTION, SOLUTION INTRAVENOUS
Refills: 0 | Status: DISCONTINUED | OUTPATIENT
Start: 2021-01-15 | End: 2021-01-16

## 2021-01-14 RX ORDER — TRANEXAMIC ACID 100 MG/ML
1000 INJECTION, SOLUTION INTRAVENOUS ONCE
Refills: 0 | Status: DISCONTINUED | OUTPATIENT
Start: 2021-01-14 | End: 2021-01-14

## 2021-01-14 RX ORDER — HYDROCHLOROTHIAZIDE 25 MG
0 TABLET ORAL
Qty: 0 | Refills: 1 | DISCHARGE

## 2021-01-14 RX ORDER — ACETAMINOPHEN 500 MG
975 TABLET ORAL ONCE
Refills: 0 | Status: COMPLETED | OUTPATIENT
Start: 2021-01-14 | End: 2021-01-14

## 2021-01-14 RX ORDER — SENNA PLUS 8.6 MG/1
2 TABLET ORAL AT BEDTIME
Refills: 0 | Status: DISCONTINUED | OUTPATIENT
Start: 2021-01-14 | End: 2021-01-16

## 2021-01-14 RX ORDER — SUCRALFATE 1 G
1 TABLET ORAL EVERY 6 HOURS
Refills: 0 | Status: DISCONTINUED | OUTPATIENT
Start: 2021-01-14 | End: 2021-01-16

## 2021-01-14 RX ORDER — ACETAMINOPHEN 500 MG
975 TABLET ORAL EVERY 8 HOURS
Refills: 0 | Status: DISCONTINUED | OUTPATIENT
Start: 2021-01-14 | End: 2021-01-16

## 2021-01-14 RX ORDER — CEFAZOLIN SODIUM 1 G
2000 VIAL (EA) INJECTION ONCE
Refills: 0 | Status: DISCONTINUED | OUTPATIENT
Start: 2021-01-14 | End: 2021-01-14

## 2021-01-14 RX ORDER — SENNA PLUS 8.6 MG/1
2 TABLET ORAL AT BEDTIME
Refills: 0 | Status: DISCONTINUED | OUTPATIENT
Start: 2021-01-14 | End: 2021-01-14

## 2021-01-14 RX ORDER — TIZANIDINE 4 MG/1
1 TABLET ORAL
Qty: 0 | Refills: 0 | DISCHARGE

## 2021-01-14 RX ORDER — HYDROMORPHONE HYDROCHLORIDE 2 MG/ML
4 INJECTION INTRAMUSCULAR; INTRAVENOUS; SUBCUTANEOUS
Refills: 0 | Status: DISCONTINUED | OUTPATIENT
Start: 2021-01-14 | End: 2021-01-16

## 2021-01-14 RX ORDER — ASPIRIN/CALCIUM CARB/MAGNESIUM 324 MG
81 TABLET ORAL EVERY 12 HOURS
Refills: 0 | Status: DISCONTINUED | OUTPATIENT
Start: 2021-01-15 | End: 2021-01-16

## 2021-01-14 RX ORDER — ACETAMINOPHEN 500 MG
1000 TABLET ORAL ONCE
Refills: 0 | Status: COMPLETED | OUTPATIENT
Start: 2021-01-14 | End: 2021-01-14

## 2021-01-14 RX ORDER — APREPITANT 80 MG/1
40 CAPSULE ORAL ONCE
Refills: 0 | Status: COMPLETED | OUTPATIENT
Start: 2021-01-14 | End: 2021-01-14

## 2021-01-14 RX ORDER — ONDANSETRON 8 MG/1
4 TABLET, FILM COATED ORAL EVERY 6 HOURS
Refills: 0 | Status: DISCONTINUED | OUTPATIENT
Start: 2021-01-14 | End: 2021-01-16

## 2021-01-14 RX ORDER — CELECOXIB 200 MG/1
400 CAPSULE ORAL ONCE
Refills: 0 | Status: COMPLETED | OUTPATIENT
Start: 2021-01-14 | End: 2021-01-14

## 2021-01-14 RX ORDER — HYDROMORPHONE HYDROCHLORIDE 2 MG/ML
0.5 INJECTION INTRAMUSCULAR; INTRAVENOUS; SUBCUTANEOUS
Refills: 0 | Status: DISCONTINUED | OUTPATIENT
Start: 2021-01-14 | End: 2021-01-14

## 2021-01-14 RX ORDER — DIVALPROEX SODIUM 500 MG/1
1 TABLET, DELAYED RELEASE ORAL
Qty: 0 | Refills: 0 | DISCHARGE

## 2021-01-14 RX ORDER — SODIUM CHLORIDE 9 MG/ML
3 INJECTION INTRAMUSCULAR; INTRAVENOUS; SUBCUTANEOUS EVERY 8 HOURS
Refills: 0 | Status: DISCONTINUED | OUTPATIENT
Start: 2021-01-14 | End: 2021-01-14

## 2021-01-14 RX ADMIN — Medication 400 MILLIGRAM(S): at 20:38

## 2021-01-14 RX ADMIN — CELECOXIB 400 MILLIGRAM(S): 200 CAPSULE ORAL at 09:51

## 2021-01-14 RX ADMIN — SODIUM CHLORIDE 500 MILLILITER(S): 9 INJECTION INTRAMUSCULAR; INTRAVENOUS; SUBCUTANEOUS at 16:37

## 2021-01-14 RX ADMIN — OXYCODONE HYDROCHLORIDE 10 MILLIGRAM(S): 5 TABLET ORAL at 20:38

## 2021-01-14 RX ADMIN — Medication 100 MILLIGRAM(S): at 20:38

## 2021-01-14 RX ADMIN — APREPITANT 40 MILLIGRAM(S): 80 CAPSULE ORAL at 09:10

## 2021-01-14 RX ADMIN — SODIUM CHLORIDE 75 MILLILITER(S): 9 INJECTION, SOLUTION INTRAVENOUS at 16:37

## 2021-01-14 RX ADMIN — Medication 975 MILLIGRAM(S): at 09:51

## 2021-01-14 RX ADMIN — SENNA PLUS 2 TABLET(S): 8.6 TABLET ORAL at 20:38

## 2021-01-14 NOTE — BRIEF OPERATIVE NOTE - NSICDXBRIEFPOSTOP_GEN_ALL_CORE_FT
POST-OP DIAGNOSIS:  Mechanical loosening of internal left knee prosthetic joint 14-Jan-2021 16:01:26  Fermin Ford

## 2021-01-14 NOTE — CONSULT NOTE ADULT - PROBLEM SELECTOR RECOMMENDATION 9
, s/p minor L TKA revision ,   PT/OT/pain mgmt  DVT prophylaxis- as per ortho  Abx as per SCIP  Incentive spirometry  Prophylaxis of opioid  induced constipation.

## 2021-01-14 NOTE — BRIEF OPERATIVE NOTE - OPERATION/FINDINGS
Synovitis, Loosened patella component, wear of tibial bearing  Xray with stable implant placement, No FB, No Fx -

## 2021-01-14 NOTE — CONSULT NOTE ADULT - ASSESSMENT
55 years old male with PMH DM (Diet controlled), HTN, HLD (Diet Controlled), Asthma and Bipolar Disorder comes to PST with c/o left leg pain.   He has chronic lateral knee pain. He reports a history of at least 5 past knee surgeries. Initial injury was due to a trauma. Patient reports recent work up revealed a failed hardware.  No recent trauma or changes to his chronic lateral knee pain reported.  He is followed by pain management by Dr. Oj bravo [phone] .

## 2021-01-14 NOTE — PROGRESS NOTE ADULT - SUBJECTIVE AND OBJECTIVE BOX
Ortho Post Op Check    Name: ELDER HERNANDEZ    MR #: [id number]    Procedure: left TKR with poly swap  Surgeon: Harshal    Pt comfortable without complaints, pain controlled  Denies CP, SOB, N/V, numbness/tingling     General Exam:  Vital Signs Last 24 Hrs  T(C): 36.5 (01-14-21 @ 19:34), Max: 36.5 (01-14-21 @ 18:00)  T(F): 97.7 (01-14-21 @ 19:34), Max: 97.7 (01-14-21 @ 18:00)  HR: 79 (01-14-21 @ 19:34) (55 - 85)  BP: 141/89 (01-14-21 @ 19:34) (110/72 - 141/89)  BP(mean): --  RR: 18 (01-14-21 @ 19:34) (10 - 18)  SpO2: 98% (01-14-21 @ 19:34) (98% - 100%)    General: Pt Alert and oriented, NAD, controlled pain.  Dressings C/D/I. No bleeding.  Pulses: 2+ dorsalis pedis pulse. Cap refill < 2 sec.  Sensation: Grossly intact to light touch without deficit.  Motor: + EHL/FHL/TA/GS - 4/5 on LLE, baseline foot drop as per pt    Post-op X-Ray:    < from: Xray Knee 1 or 2 Views, Left (01.14.21 @ 16:00) >     EXAM:  KNEE-LEFT                          PROCEDURE DATE:  01/14/2021      INTERPRETATION:  Left knee. Postoperative AP and lateral views show a knee replacement with a long tibial stem. Alignment is good. No bone destruction or fracture is evident.    The appearance is similar to October 14, 2010 although there may been interval. Patellar surgery.    IMPRESSION: Knee surgery as above.    JE GUILLORY MD; Attending Radiologist  This document has been electronically signed. Jan 14 2021  4:02PM    < end of copied text >    A/P: 55yMale POD#0 s/p left TKR with poly swap  - Stable  - Pain Control  - DVT ppx: asa  - Post op abx: ancef, keflex  - Weight bearing status: wbat

## 2021-01-14 NOTE — PHYSICAL THERAPY INITIAL EVALUATION ADULT - GENERAL OBSERVATIONS, REHAB EVAL
Pt received in bed with + IV loc, bilateral venous compression boots, breathing on RA in NAD, in 7/10 pain agreeable to PT evaluation

## 2021-01-14 NOTE — PHYSICAL THERAPY INITIAL EVALUATION ADULT - RANGE OF MOTION EXAMINATION, REHAB EVAL
Left Knee AROM flexion/extension decreased by ~25%, and pt reporting mild Left LE drop foot from prior surgeries. Pt demonstrating decreased left ankle DF/PF and Left ankle at rest is internally rotated./bilateral lower extremity was ROM was WNL (within normal limits)

## 2021-01-14 NOTE — PHYSICAL THERAPY INITIAL EVALUATION ADULT - ADDITIONAL COMMENTS
Pt lives alone, however per patient he is able to get daughter to be an aide for 35hrs a week through medicaid. No steps to enter/inside. Pt ambulated with cane prior to admission, independent with mobility & ADLs prior. Pt owns RW, cane, shower chair and commode. Pt drives, and is retired.

## 2021-01-14 NOTE — PHYSICAL THERAPY INITIAL EVALUATION ADULT - WEIGHT-BEARING RESTRICTIONS: SIT/STAND, REHAB EVAL
I spoke with significant other, Meghna, and she advised that patient switched to Medicare on 3/1/2018. Order for hospital bed was sent to [facility] but they need office notes dated after 3/1/2018 that note he needs a hospital bed.  Fax to [phone].        Order for ST faxed to [facility] per note below.      Routing to  to review and advise on office note for hospital bed.      RANJEET Crow, RN, N  [facility]) [phone]       Left LE/weight-bearing as tolerated

## 2021-01-14 NOTE — BRIEF OPERATIVE NOTE - NSICDXBRIEFPREOP_GEN_ALL_CORE_FT
PRE-OP DIAGNOSIS:  Mechanical loosening of internal left knee prosthetic joint 14-Jan-2021 16:00:50  Fermin Ford

## 2021-01-14 NOTE — CONSULT NOTE ADULT - SUBJECTIVE AND OBJECTIVE BOX
Patient is a 55y old  Male who is s/p minor L TKA revision , POD # 0 , seen and examined on PACU .     CC: Chronic L knee pain       HPI:  55 years old male with PMH DM (Diet controlled), HTN, HLD (Diet Controlled), Asthma and Bipolar Disorder comes to PST with c/o left leg pain.   He has chronic lateral knee pain. He reports a history of at least 5 past knee surgeries. Initial injury was due to a trauma. Patient reports recent work up revealed a failed hardware.  No recent trauma or changes to his chronic lateral knee pain reported.  He is followed by pain management by Dr. Oj bravo [phone] .     PAST MEDICAL & SURGICAL HISTORY:  HNP (herniated nucleus pulposus), lumbar    History of Helicobacter pylori infection    Chronic pain of left knee    Chronic back pain    HLD (hyperlipidemia)    HTN (hypertension)    Gastritis    Bipolar disorder    Diabetes    Asthma    History of removal of joint prosthesis of left knee due to infection    S/P revision of total knee, left    H/O colonoscopy    H/O endoscopy    S/P knee surgery    S/P hernia surgery        Social History:  Tobacco - ex smoker  ETOH -   Illicit drug abuse - denies    FAMILY HISTORY:      Allergies    Reynolds (Unknown)  No Known Drug Allergies  pork (Unknown)    Intolerances        HOME MEDICATIONS :   · 	Carafate 1 g oral tablet: Last Dose Taken:  , 1 tab(s) orally 4 times a day (before meals and at bedtime)   · 	Protonix 40 mg oral delayed release tablet: Last Dose Taken:  , 1 tab(s) orally once a day   · 	oxyCODONE 10 mg oral tablet: Last Dose Taken:  , orally 5 times a day  · 	tiZANidine 4 mg oral tablet: Last Dose Taken:  , 1 tab(s) orally once a day (at bedtime)  · 	Depakote  mg oral tablet, extended release: Last Dose Taken:  , 1 tab(s) orally once a day  · 	OXYCODONE 5MG TAB: Last Dose Taken:  , TAKE 1 TABLET BY MOUTH EVERY 4 HOURS AS NEEDED FOR PAIN . DO NOT EXCEED 4 PER 24 HOURS  · 	HYDROCHLOROTHIAZIDE 12.5MG TAB: Last Dose Taken:  , TAKE 1 TABLET BY MOUTH ONCE DAILY    REVIEW OF SYSTEMS:    Chronic L knee pain , all other systems are reviewed and are negative .     MEDICATIONS  (STANDING):  cephalexin 500 milliGRAM(s) Oral four times a day  diVALproex  milliGRAM(s) Oral daily  lactated ringers. 1000 milliLiter(s) (75 mL/Hr) IV Continuous <Continuous>  sodium chloride 0.9% Bolus 500 milliLiter(s) IV Bolus once    MEDICATIONS  (PRN):  HYDROmorphone  Injectable 0.5 milliGRAM(s) IV Push every 10 minutes PRN Moderate Pain (4 - 6)  ondansetron Injectable 4 milliGRAM(s) IV Push every 6 hours PRN Nausea and/or Vomiting      Vital Signs Last 24 Hrs  T(C): 36.5 (14 Jan 2021 15:50), Max: 36.5 (14 Jan 2021 15:50)  T(F): 97.7 (14 Jan 2021 15:50), Max: 97.7 (14 Jan 2021 15:50)  HR: 70 (14 Jan 2021 16:30) (55 - 88)  BP: 117/70 (14 Jan 2021 16:30) (99/62 - 137/99)  BP(mean): --  RR: 13 (14 Jan 2021 16:30) (12 - 16)  SpO2: 100% (14 Jan 2021 16:30) (97% - 100%)    PHYSICAL EXAM:    GENERAL: NAD, well-groomed, well-developed  HEAD:  Atraumatic, Normocephalic  EYES: EOMI, PERRLA, conjunctiva and sclera clear  NECK: Supple, No JVD, Normal thyroid  NERVOUS SYSTEM:  Alert & Oriented X3,  no focal deficit   CHEST/LUNG: CTA  b/l,  no rales, rhonchi, wheezing, or rubs  HEART: Regular rate and rhythm; No murmurs, rubs, or gallops  ABDOMEN: Soft, Nontender, Nondistended; Bowel sounds present  EXTREMITIES:  2+ Peripheral Pulses, No clubbing, cyanosis, or edema ,   LYMPH: No lymphadenopathy noted  SKIN: No rashes or lesions    LABS: Pending     RADIOLOGY & ADDITIONAL STUDIES:  < from: Xray Knee 1 or 2 Views, Left (01.14.21 @ 16:00) >     EXAM:  KNEE-LEFT                          PROCEDURE DATE:  01/14/2021          INTERPRETATION:  Left knee. Postoperative AP and lateral views show a knee replacement with a long tibial stem. Alignment is good. No bone destruction or fracture is evident.    The appearance is similar to October 14, 2010 although there may been interval. Patellar surgery.    IMPRESSION: Knee surgery as above.      JE GUILLORY MD; Attending Radiologist  This document has been electronically signed. Jan 14 2021  4:02PM    < end of copied text >

## 2021-01-15 LAB
ANION GAP SERPL CALC-SCNC: 9 MMOL/L — SIGNIFICANT CHANGE UP (ref 5–17)
BUN SERPL-MCNC: 11 MG/DL — SIGNIFICANT CHANGE UP (ref 8–20)
CALCIUM SERPL-MCNC: 8.5 MG/DL — LOW (ref 8.6–10.2)
CHLORIDE SERPL-SCNC: 108 MMOL/L — HIGH (ref 98–107)
CO2 SERPL-SCNC: 22 MMOL/L — SIGNIFICANT CHANGE UP (ref 22–29)
CREAT SERPL-MCNC: 0.63 MG/DL — SIGNIFICANT CHANGE UP (ref 0.5–1.3)
GLUCOSE SERPL-MCNC: 89 MG/DL — SIGNIFICANT CHANGE UP (ref 70–99)
GRAM STN FLD: SIGNIFICANT CHANGE UP
HCT VFR BLD CALC: 39.9 % — SIGNIFICANT CHANGE UP (ref 39–50)
HGB BLD-MCNC: 13.3 G/DL — SIGNIFICANT CHANGE UP (ref 13–17)
MCHC RBC-ENTMCNC: 30.6 PG — SIGNIFICANT CHANGE UP (ref 27–34)
MCHC RBC-ENTMCNC: 33.3 GM/DL — SIGNIFICANT CHANGE UP (ref 32–36)
MCV RBC AUTO: 91.7 FL — SIGNIFICANT CHANGE UP (ref 80–100)
PLATELET # BLD AUTO: 270 K/UL — SIGNIFICANT CHANGE UP (ref 150–400)
POTASSIUM SERPL-MCNC: 4.3 MMOL/L — SIGNIFICANT CHANGE UP (ref 3.5–5.3)
POTASSIUM SERPL-SCNC: 4.3 MMOL/L — SIGNIFICANT CHANGE UP (ref 3.5–5.3)
RBC # BLD: 4.35 M/UL — SIGNIFICANT CHANGE UP (ref 4.2–5.8)
RBC # FLD: 13.2 % — SIGNIFICANT CHANGE UP (ref 10.3–14.5)
SODIUM SERPL-SCNC: 139 MMOL/L — SIGNIFICANT CHANGE UP (ref 135–145)
SPECIMEN SOURCE: SIGNIFICANT CHANGE UP
WBC # BLD: 8.44 K/UL — SIGNIFICANT CHANGE UP (ref 3.8–10.5)
WBC # FLD AUTO: 8.44 K/UL — SIGNIFICANT CHANGE UP (ref 3.8–10.5)

## 2021-01-15 PROCEDURE — 99232 SBSQ HOSP IP/OBS MODERATE 35: CPT

## 2021-01-15 RX ADMIN — Medication 500 MILLIGRAM(S): at 18:05

## 2021-01-15 RX ADMIN — Medication 500 MILLIGRAM(S): at 05:34

## 2021-01-15 RX ADMIN — OXYCODONE HYDROCHLORIDE 10 MILLIGRAM(S): 5 TABLET ORAL at 20:43

## 2021-01-15 RX ADMIN — Medication 975 MILLIGRAM(S): at 13:42

## 2021-01-15 RX ADMIN — Medication 81 MILLIGRAM(S): at 18:06

## 2021-01-15 RX ADMIN — Medication 1 GRAM(S): at 05:34

## 2021-01-15 RX ADMIN — CELECOXIB 200 MILLIGRAM(S): 200 CAPSULE ORAL at 05:34

## 2021-01-15 RX ADMIN — Medication 1 GRAM(S): at 18:05

## 2021-01-15 RX ADMIN — Medication 975 MILLIGRAM(S): at 05:34

## 2021-01-15 RX ADMIN — CELECOXIB 200 MILLIGRAM(S): 200 CAPSULE ORAL at 18:05

## 2021-01-15 RX ADMIN — SENNA PLUS 2 TABLET(S): 8.6 TABLET ORAL at 20:42

## 2021-01-15 RX ADMIN — PANTOPRAZOLE SODIUM 40 MILLIGRAM(S): 20 TABLET, DELAYED RELEASE ORAL at 05:34

## 2021-01-15 RX ADMIN — Medication 81 MILLIGRAM(S): at 05:34

## 2021-01-15 RX ADMIN — Medication 1 GRAM(S): at 23:00

## 2021-01-15 RX ADMIN — OXYCODONE HYDROCHLORIDE 10 MILLIGRAM(S): 5 TABLET ORAL at 13:43

## 2021-01-15 RX ADMIN — Medication 1 GRAM(S): at 13:42

## 2021-01-15 RX ADMIN — DIVALPROEX SODIUM 500 MILLIGRAM(S): 500 TABLET, DELAYED RELEASE ORAL at 13:42

## 2021-01-15 RX ADMIN — OXYCODONE HYDROCHLORIDE 10 MILLIGRAM(S): 5 TABLET ORAL at 05:34

## 2021-01-15 RX ADMIN — Medication 500 MILLIGRAM(S): at 23:00

## 2021-01-15 RX ADMIN — Medication 500 MILLIGRAM(S): at 13:42

## 2021-01-15 RX ADMIN — Medication 975 MILLIGRAM(S): at 20:42

## 2021-01-15 NOTE — PROGRESS NOTE ADULT - SUBJECTIVE AND OBJECTIVE BOX
CC: s/p minor L TKA revision     INTERVAL HPI/OVERNIGHT EVENTS: PAtient seen and examined. No acute issues overnight. Pain is chronic, appears to be controlled on current RX. Denies chest pain, SOB, dizziness, nausea, vomiting, fever, chills.     Vital Signs Last 24 Hrs  T(C): 36.6 (15 Kevin 2021 07:28), Max: 36.8 (15 Kevin 2021 00:13)  T(F): 97.8 (15 Kevin 2021 07:28), Max: 98.2 (15 Kevin 2021 00:13)  HR: 67 (15 Kevin 2021 07:28) (55 - 85)  BP: 135/78 (15 Kevin 2021 07:28) (99/62 - 141/89)  BP(mean): --  RR: 18 (15 Kevin 2021 07:28) (10 - 18)  SpO2: 98% (15 Kevin 2021 07:28) (95% - 100%)      PHYSICAL EXAM:    GENERAL: NAD, well-groomed, well-developed  HEAD:  Atraumatic, Normocephalic  EYES: EOMI, PERRLA, conjunctiva and sclera clear  NECK: Supple, No JVD, Normal thyroid  NERVOUS SYSTEM:  Alert & Oriented X3,  no focal deficit   CHEST/LUNG: CTA  b/l,  no rales, rhonchi, wheezing, or rubs  HEART: Regular rate and rhythm; No murmurs, rubs, or gallops  ABDOMEN: Soft, Nontender, Nondistended; Bowel sounds present  EXTREMITIES:  2+ Peripheral Pulses, Left ACE wrap in place  LYMPH: No lymphadenopathy noted  SKIN: No rashes or lesions    I&O's Detail    14 Jan 2021 07:01  -  15 Kevin 2021 07:00  --------------------------------------------------------  IN:    Lactated Ringers: 1000 mL  Total IN: 1000 mL    OUT:    Voided (mL): 1550 mL  Total OUT: 1550 mL    Total NET: -550 mL                                    13.3   8.44  )-----------( 270      ( 15 Kevin 2021 07:41 )             39.9     15 Kevin 2021 07:41    139    |  108    |  11.0   ----------------------------<  89     4.3     |  22.0   |  0.63     Ca    8.5        15 Kevin 2021 07:41        CAPILLARY BLOOD GLUCOSE      POCT Blood Glucose.: 85 mg/dL (14 Jan 2021 16:02)  POCT Blood Glucose.: 106 mg/dL (14 Jan 2021 15:21)          MEDICATIONS  (STANDING):  acetaminophen   Tablet .. 975 milliGRAM(s) Oral every 8 hours  aspirin enteric coated 81 milliGRAM(s) Oral every 12 hours  celecoxib 200 milliGRAM(s) Oral every 12 hours  cephalexin 500 milliGRAM(s) Oral four times a day  diVALproex  milliGRAM(s) Oral daily  HYDROmorphone  Injectable 0.5 milliGRAM(s) IV Push once  lactated ringers. 1000 milliLiter(s) (125 mL/Hr) IV Continuous <Continuous>  pantoprazole    Tablet 40 milliGRAM(s) Oral before breakfast  senna 2 Tablet(s) Oral at bedtime  sucralfate 1 Gram(s) Oral every 6 hours    MEDICATIONS  (PRN):  HYDROmorphone   Tablet 4 milliGRAM(s) Oral every 3 hours PRN Severe Pain (7 - 10)  ondansetron Injectable 4 milliGRAM(s) IV Push every 6 hours PRN Nausea and/or Vomiting  oxyCODONE    IR 10 milliGRAM(s) Oral every 3 hours PRN Mild Pain (1 - 3)  oxyCODONE    IR 15 milliGRAM(s) Oral every 3 hours PRN Moderate Pain (4 - 6)      RADIOLOGY & ADDITIONAL TESTS:   CC: s/p minor L TKA revision     INTERVAL HPI/OVERNIGHT EVENTS: PAtient seen and examined. No acute issues overnight. Pain is chronic, appears to be controlled on current RX. Denies chest pain, SOB, dizziness, nausea, vomiting, fever, chills.     Vital Signs Last 24 Hrs  T(C): 36.6 (15 Kevin 2021 07:28), Max: 36.8 (15 Kevin 2021 00:13)  T(F): 97.8 (15 Kevin 2021 07:28), Max: 98.2 (15 Kevin 2021 00:13)  HR: 67 (15 Kevin 2021 07:28) (55 - 85)  BP: 135/78 (15 Kevin 2021 07:28) (99/62 - 141/89)  BP(mean): --  RR: 18 (15 Kevin 2021 07:28) (10 - 18)  SpO2: 98% (15 Kevin 2021 07:28) (95% - 100%)      PHYSICAL EXAM:    GENERAL: NAD, well-groomed, well-developed  HEAD:  Atraumatic, Normocephalic  EYES: EOMI, PERRLA, conjunctiva and sclera clear  NECK: Supple, No JVD, Normal thyroid  NERVOUS SYSTEM:  Alert & Oriented X3,  no focal deficit   CHEST/LUNG: CTA  b/l,  no rales, rhonchi, wheezing, or rubs  HEART: Regular rate and rhythm; No murmurs, rubs, or gallops  ABDOMEN: Soft, Nontender, Nondistended; Bowel sounds present  EXTREMITIES:  2+ Peripheral Pulses, Left ACE wrap in place  LYMPH: No lymphadenopathy noted  SKIN: No rashes or lesions    I&O's Detail    14 Jan 2021 07:01  -  15 Kevin 2021 07:00  --------------------------------------------------------  IN:    Lactated Ringers: 1000 mL  Total IN: 1000 mL    OUT:    Voided (mL): 1550 mL  Total OUT: 1550 mL    Total NET: -550 mL                       13.3   8.44  )-----------( 270      ( 15 Kevin 2021 07:41 )             39.9     15 Kevin 2021 07:41    139    |  108    |  11.0   ----------------------------<  89     4.3     |  22.0   |  0.63     Ca    8.5        15 Kevin 2021 07:41        CAPILLARY BLOOD GLUCOSE      POCT Blood Glucose.: 85 mg/dL (14 Jan 2021 16:02)  POCT Blood Glucose.: 106 mg/dL (14 Jan 2021 15:21)          MEDICATIONS  (STANDING):  acetaminophen   Tablet .. 975 milliGRAM(s) Oral every 8 hours  aspirin enteric coated 81 milliGRAM(s) Oral every 12 hours  celecoxib 200 milliGRAM(s) Oral every 12 hours  cephalexin 500 milliGRAM(s) Oral four times a day  diVALproex  milliGRAM(s) Oral daily  HYDROmorphone  Injectable 0.5 milliGRAM(s) IV Push once  lactated ringers. 1000 milliLiter(s) (125 mL/Hr) IV Continuous <Continuous>  pantoprazole    Tablet 40 milliGRAM(s) Oral before breakfast  senna 2 Tablet(s) Oral at bedtime  sucralfate 1 Gram(s) Oral every 6 hours    MEDICATIONS  (PRN):  HYDROmorphone   Tablet 4 milliGRAM(s) Oral every 3 hours PRN Severe Pain (7 - 10)  ondansetron Injectable 4 milliGRAM(s) IV Push every 6 hours PRN Nausea and/or Vomiting  oxyCODONE    IR 10 milliGRAM(s) Oral every 3 hours PRN Mild Pain (1 - 3)  oxyCODONE    IR 15 milliGRAM(s) Oral every 3 hours PRN Moderate Pain (4 - 6)      RADIOLOGY & ADDITIONAL TESTS:    < from: Xray Knee 1 or 2 Views, Left (01.14.21 @ 16:00) >     EXAM:  KNEE-LEFT                          PROCEDURE DATE:  01/14/2021          INTERPRETATION:  Left knee. Postoperative AP and lateral views show a knee replacement with a long tibial stem. Alignment is good. No bone destruction or fracture is evident.    The appearance is similar to October 14, 2010 although there may been interval. Patellar surgery.    IMPRESSION: Knee surgery as above.            JE GUILLORY MD; Attending Radiologist  This document has been electronically signed. Jan 14 2021  4:02PM    < end of copied text >

## 2021-01-15 NOTE — PROGRESS NOTE ADULT - SUBJECTIVE AND OBJECTIVE BOX
ELDER HERNANDEZ  [id number]    History: 55y Male is status post left total knee revision, POD # 1. Patient is doing well and is comfortable. The patient's pain is controlled using the prescribed pain medications. The patient is participating in physical therapy. Denies nausea, vomiting, chest pain, shortness of breath, abdominal pain or fever. No new complaints.    Vital Signs Last 24 Hrs  T(C): 36.6 (15 Kevin 2021 07:28), Max: 36.8 (15 Kevin 2021 00:13)  T(F): 97.8 (15 Kevin 2021 07:28), Max: 98.2 (15 Kevin 2021 00:13)  HR: 67 (15 Kevin 2021 07:28) (55 - 85)  BP: 135/78 (15 Kevin 2021 07:28) (99/62 - 141/89)  BP(mean): --  RR: 18 (15 Kevin 2021 07:28) (10 - 18)  SpO2: 98% (15 Kevin 2021 07:28) (95% - 100%)                        13.3   8.44  )-----------( 270      ( 15 Kevin 2021 07:41 )             39.9     01-15    139  |  108<H>  |  11.0  ----------------------------<  89  4.3   |  22.0  |  0.63    Ca    8.5<L>      15 Kevin 2021 07:41    MEDICATIONS  (STANDING):  acetaminophen   Tablet .. 975 milliGRAM(s) Oral every 8 hours  aspirin enteric coated 81 milliGRAM(s) Oral every 12 hours  celecoxib 200 milliGRAM(s) Oral every 12 hours  cephalexin 500 milliGRAM(s) Oral four times a day  diVALproex  milliGRAM(s) Oral daily  HYDROmorphone  Injectable 0.5 milliGRAM(s) IV Push once  lactated ringers. 1000 milliLiter(s) (125 mL/Hr) IV Continuous <Continuous>  pantoprazole    Tablet 40 milliGRAM(s) Oral before breakfast  senna 2 Tablet(s) Oral at bedtime  sucralfate 1 Gram(s) Oral every 6 hours    MEDICATIONS  (PRN):  HYDROmorphone   Tablet 4 milliGRAM(s) Oral every 3 hours PRN Severe Pain (7 - 10)  ondansetron Injectable 4 milliGRAM(s) IV Push every 6 hours PRN Nausea and/or Vomiting  oxyCODONE    IR 10 milliGRAM(s) Oral every 3 hours PRN Mild Pain (1 - 3)  oxyCODONE    IR 15 milliGRAM(s) Oral every 3 hours PRN Moderate Pain (4 - 6)    Physical exam: The left knee ACE dressing is clean, dry and intact. The calf is supple nontender. Passive range of motion is acceptable to due postoperative pain. Sensation to light touch is grossly intact distally. Motor function distally is 5/5. Extensor hallucis longus and flexor hallucis longus are intact. No foot drop. 2+ dorsalis pedis pulse. Capillary refill is less than 2 seconds. No cyanosis.    Primary Orthopedic Assessment:  •	s/p LEFT total knee revision    Secondary  Medical Assessment(s):   • DM, HTN, Hx left foot drop	    Plan:   •	DVT prophylaxis as prescribed, including use of compression devices and ankle pumps  •	Continue physical therapy  •	Weightbearing as tolerated of the left lower extremity with assistance of a walker  •	Incentive spirometry encouraged  •	Pain control as clinically indicated  •	Discharge planning – anticipated discharge is Home when cleared by PT and Medicine

## 2021-01-15 NOTE — CHART NOTE - NSCHARTNOTEFT_GEN_A_CORE
Patient Carlos Eller was fit and delivered a spiral design AFO, well padded carbon lamination and protective type socks for skin protection and hygiene. Orthosis fit the patient well and he was educated on the care use and function of the orthosis. All went without incident.   Boyd DIAL, [facility]  [phone]

## 2021-01-15 NOTE — PROGRESS NOTE ADULT - ASSESSMENT
55 years old male with PMH DM (Diet controlled), HTN, HLD (Diet Controlled), Asthma and Bipolar Disorder comes to PST with c/o left leg pain.   He has chronic lateral knee pain. He reports a history of at least 5 past knee surgeries. Initial injury was due to a trauma. Patient reports recent work up revealed a failed hardware.  No recent trauma or changes to his chronic lateral knee pain reported.  He is followed by pain management by Dr. Oj bravo [phone] .      Problem/Recommendation - 1:  Problem: Failure of total knee replacement, initial encounter. Recommendation: , s/p minor L TKA revision ,   PT/OT/pain mgmt  DVT prophylaxis- as per ortho  Abx as per SCIP  Incentive spirometry  Prophylaxis of opioid  induced constipation.     Problem/Recommendation - 2:  ·  Problem: Essential hypertension.  Recommendation: - continue home meds with parameters.      Problem/Recommendation - 3:  ·  Problem: Hyperlipidemia, unspecified hyperlipidemia type.  Recommendation: - diet controlled.      Problem/Recommendation - 4:  ·  Problem: Type 2 diabetes mellitus without complication, with long-term current use of insulin.  Recommendation: - diet controlled.      Problem/Recommendation - 5:  ·  Problem: Gastritis.  Recommendation: - continue PPI.      Problem/Recommendation - 6:  Problem: Bipolar disorder. Recommendation: - continue home meds.     Problem/Recommendation - 7:  Problem: Asthma. Recommendation: - stable.    Dispo: Lina zhou from medical point of view.  55 years old male with PMH DM (Diet controlled), HTN, HLD (Diet Controlled), Asthma and Bipolar Disorder comes to PST with c/o left leg pain.   He has chronic lateral knee pain. He reports a history of at least 5 past knee surgeries. Initial injury was due to a trauma. Patient reports recent work up revealed a failed hardware.  No recent trauma or changes to his chronic lateral knee pain reported.  He is followed by pain management by Dr. Oj bravo [phone] .      Problem/Recommendation - 1:  Problem: Failure of total knee replacement, initial encounter. Recommendation: , s/p minor L TKA revision ,   PT/OT/pain mgmt  DVT prophylaxis- as per ortho  Abx as per SCIP- given   Incentive spirometry  Prophylaxis of opioid  induced constipation.     Problem/Recommendation - 2:  ·  Problem: Essential hypertension.  Recommendation: - continue home meds with parameters.      Problem/Recommendation - 3:  ·  Problem: Hyperlipidemia, unspecified hyperlipidemia type.  Recommendation: - diet controlled.      Problem/Recommendation - 4:  ·  Problem: Type 2 diabetes mellitus without complication   Recommendation: - diet controlled.     HgA1C - 6.0      Problem/Recommendation - 5:  ·  Problem: Gastritis.  Recommendation: - continue PPI.      Problem/Recommendation - 6:  Problem: Bipolar disorder. Recommendation: - continue home meds.     Problem/Recommendation - 7:  Problem: Asthma. Recommendation: - stable.    Dispo: Lina zhou from medical point of view.

## 2021-01-15 NOTE — PROGRESS NOTE ADULT - ATTENDING COMMENTS
Patient seen and examined , s/l L TKA minor revision ,   pain well controlled , vitals stable ,   labs reviewed , participating with physical therapy .   Above noted , reviewed with NP ( Edna ) ,   agree with above /   D/W patient / nurse / ortho PA .   Dispo plan is Home - likely today .    Medically stable to d/c once cleared by physical therapy / ortho .

## 2021-01-16 ENCOUNTER — TRANSCRIPTION ENCOUNTER (OUTPATIENT)
Age: 56
End: 2021-01-16

## 2021-01-16 VITALS
OXYGEN SATURATION: 97 % | RESPIRATION RATE: 19 BRPM | HEART RATE: 80 BPM | TEMPERATURE: 98 F | DIASTOLIC BLOOD PRESSURE: 78 MMHG | SYSTOLIC BLOOD PRESSURE: 120 MMHG

## 2021-01-16 LAB
ANION GAP SERPL CALC-SCNC: 10 MMOL/L — SIGNIFICANT CHANGE UP (ref 5–17)
BASOPHILS # BLD AUTO: 0.05 K/UL — SIGNIFICANT CHANGE UP (ref 0–0.2)
BASOPHILS NFR BLD AUTO: 0.6 % — SIGNIFICANT CHANGE UP (ref 0–2)
BUN SERPL-MCNC: 10 MG/DL — SIGNIFICANT CHANGE UP (ref 8–20)
CALCIUM SERPL-MCNC: 8.7 MG/DL — SIGNIFICANT CHANGE UP (ref 8.6–10.2)
CHLORIDE SERPL-SCNC: 105 MMOL/L — SIGNIFICANT CHANGE UP (ref 98–107)
CO2 SERPL-SCNC: 24 MMOL/L — SIGNIFICANT CHANGE UP (ref 22–29)
CREAT SERPL-MCNC: 0.66 MG/DL — SIGNIFICANT CHANGE UP (ref 0.5–1.3)
EOSINOPHIL # BLD AUTO: 0.02 K/UL — SIGNIFICANT CHANGE UP (ref 0–0.5)
EOSINOPHIL NFR BLD AUTO: 0.2 % — SIGNIFICANT CHANGE UP (ref 0–6)
GLUCOSE SERPL-MCNC: 130 MG/DL — HIGH (ref 70–99)
HCT VFR BLD CALC: 41 % — SIGNIFICANT CHANGE UP (ref 39–50)
HGB BLD-MCNC: 13.4 G/DL — SIGNIFICANT CHANGE UP (ref 13–17)
IMM GRANULOCYTES NFR BLD AUTO: 0.4 % — SIGNIFICANT CHANGE UP (ref 0–1.5)
LYMPHOCYTES # BLD AUTO: 2.21 K/UL — SIGNIFICANT CHANGE UP (ref 1–3.3)
LYMPHOCYTES # BLD AUTO: 26.3 % — SIGNIFICANT CHANGE UP (ref 13–44)
MCHC RBC-ENTMCNC: 30.5 PG — SIGNIFICANT CHANGE UP (ref 27–34)
MCHC RBC-ENTMCNC: 32.7 GM/DL — SIGNIFICANT CHANGE UP (ref 32–36)
MCV RBC AUTO: 93.4 FL — SIGNIFICANT CHANGE UP (ref 80–100)
MONOCYTES # BLD AUTO: 0.81 K/UL — SIGNIFICANT CHANGE UP (ref 0–0.9)
MONOCYTES NFR BLD AUTO: 9.6 % — SIGNIFICANT CHANGE UP (ref 2–14)
NEUTROPHILS # BLD AUTO: 5.29 K/UL — SIGNIFICANT CHANGE UP (ref 1.8–7.4)
NEUTROPHILS NFR BLD AUTO: 62.9 % — SIGNIFICANT CHANGE UP (ref 43–77)
PLATELET # BLD AUTO: 277 K/UL — SIGNIFICANT CHANGE UP (ref 150–400)
POTASSIUM SERPL-MCNC: 4.1 MMOL/L — SIGNIFICANT CHANGE UP (ref 3.5–5.3)
POTASSIUM SERPL-SCNC: 4.1 MMOL/L — SIGNIFICANT CHANGE UP (ref 3.5–5.3)
RBC # BLD: 4.39 M/UL — SIGNIFICANT CHANGE UP (ref 4.2–5.8)
RBC # FLD: 13.3 % — SIGNIFICANT CHANGE UP (ref 10.3–14.5)
SODIUM SERPL-SCNC: 139 MMOL/L — SIGNIFICANT CHANGE UP (ref 135–145)
WBC # BLD: 8.41 K/UL — SIGNIFICANT CHANGE UP (ref 3.8–10.5)
WBC # FLD AUTO: 8.41 K/UL — SIGNIFICANT CHANGE UP (ref 3.8–10.5)

## 2021-01-16 PROCEDURE — 88305 TISSUE EXAM BY PATHOLOGIST: CPT

## 2021-01-16 PROCEDURE — 85027 COMPLETE CBC AUTOMATED: CPT

## 2021-01-16 PROCEDURE — 73560 X-RAY EXAM OF KNEE 1 OR 2: CPT

## 2021-01-16 PROCEDURE — 97110 THERAPEUTIC EXERCISES: CPT

## 2021-01-16 PROCEDURE — 82962 GLUCOSE BLOOD TEST: CPT

## 2021-01-16 PROCEDURE — 87075 CULTR BACTERIA EXCEPT BLOOD: CPT

## 2021-01-16 PROCEDURE — C1713: CPT

## 2021-01-16 PROCEDURE — 80048 BASIC METABOLIC PNL TOTAL CA: CPT

## 2021-01-16 PROCEDURE — 85025 COMPLETE CBC W/AUTO DIFF WBC: CPT

## 2021-01-16 PROCEDURE — 97116 GAIT TRAINING THERAPY: CPT

## 2021-01-16 PROCEDURE — 87070 CULTURE OTHR SPECIMN AEROBIC: CPT

## 2021-01-16 PROCEDURE — C1776: CPT

## 2021-01-16 PROCEDURE — 36415 COLL VENOUS BLD VENIPUNCTURE: CPT

## 2021-01-16 PROCEDURE — 88311 DECALCIFY TISSUE: CPT

## 2021-01-16 PROCEDURE — 97167 OT EVAL HIGH COMPLEX 60 MIN: CPT

## 2021-01-16 PROCEDURE — 87205 SMEAR GRAM STAIN: CPT

## 2021-01-16 PROCEDURE — 99232 SBSQ HOSP IP/OBS MODERATE 35: CPT

## 2021-01-16 RX ORDER — OXYCODONE HYDROCHLORIDE 5 MG/1
1 TABLET ORAL
Qty: 42 | Refills: 0
Start: 2021-01-16 | End: 2021-01-22

## 2021-01-16 RX ORDER — OXYCODONE HYDROCHLORIDE 5 MG/1
0 TABLET ORAL
Qty: 0 | Refills: 0 | DISCHARGE

## 2021-01-16 RX ORDER — ACETAMINOPHEN 500 MG
3 TABLET ORAL
Qty: 0 | Refills: 0 | DISCHARGE
Start: 2021-01-16

## 2021-01-16 RX ORDER — ASPIRIN/CALCIUM CARB/MAGNESIUM 324 MG
1 TABLET ORAL
Qty: 84 | Refills: 0
Start: 2021-01-16 | End: 2021-02-26

## 2021-01-16 RX ORDER — SENNOSIDES/DOCUSATE SODIUM 8.6MG-50MG
2 TABLET ORAL
Qty: 28 | Refills: 0
Start: 2021-01-16 | End: 2021-01-29

## 2021-01-16 RX ORDER — CELECOXIB 200 MG/1
1 CAPSULE ORAL
Qty: 42 | Refills: 0
Start: 2021-01-16 | End: 2021-02-05

## 2021-01-16 RX ADMIN — DIVALPROEX SODIUM 500 MILLIGRAM(S): 500 TABLET, DELAYED RELEASE ORAL at 12:25

## 2021-01-16 RX ADMIN — OXYCODONE HYDROCHLORIDE 10 MILLIGRAM(S): 5 TABLET ORAL at 04:59

## 2021-01-16 RX ADMIN — Medication 975 MILLIGRAM(S): at 04:59

## 2021-01-16 RX ADMIN — Medication 1 GRAM(S): at 12:25

## 2021-01-16 RX ADMIN — Medication 1 GRAM(S): at 04:59

## 2021-01-16 RX ADMIN — Medication 500 MILLIGRAM(S): at 12:25

## 2021-01-16 RX ADMIN — PANTOPRAZOLE SODIUM 40 MILLIGRAM(S): 20 TABLET, DELAYED RELEASE ORAL at 04:59

## 2021-01-16 RX ADMIN — Medication 81 MILLIGRAM(S): at 04:59

## 2021-01-16 RX ADMIN — CELECOXIB 200 MILLIGRAM(S): 200 CAPSULE ORAL at 04:59

## 2021-01-16 RX ADMIN — Medication 975 MILLIGRAM(S): at 12:25

## 2021-01-16 RX ADMIN — Medication 500 MILLIGRAM(S): at 04:59

## 2021-01-16 RX ADMIN — OXYCODONE HYDROCHLORIDE 10 MILLIGRAM(S): 5 TABLET ORAL at 10:44

## 2021-01-16 NOTE — DISCHARGE NOTE PROVIDER - NSDCMRMEDTOKEN_GEN_ALL_CORE_FT
acetaminophen 325 mg oral tablet: 3 tab(s) orally every 8 hours  aspirin 81 mg oral delayed release tablet: 1 tab(s) orally every 12 hours  Carafate 1 g oral tablet: 1 tab(s) orally 4 times a day (before meals and at bedtime)   cefadroxil 500 mg oral capsule: 1 cap(s) orally every 12 hours   celecoxib 200 mg oral capsule: 1 cap(s) orally every 12 hours  Depakote  mg oral tablet, extended release: 1 tab(s) orally once a day  HYDROCHLOROTHIAZIDE 12.5MG TAB: TAKE 1 TABLET BY MOUTH ONCE DAILY  oxyCODONE 10 mg oral tablet: 1 tab(s) orally every 4 to 6 hours, As Needed -Mild to Moderate Pain (1 - 3) MDD:6  Protonix 40 mg oral delayed release tablet: 1 tab(s) orally once a day   Senna S 50 mg-8.6 mg oral tablet: 2 tab(s) orally once a day (at bedtime)   tiZANidine 4 mg oral tablet: 1 tab(s) orally once a day (at bedtime)

## 2021-01-16 NOTE — DISCHARGE NOTE NURSING/CASE MANAGEMENT/SOCIAL WORK - PATIENT PORTAL LINK FT
You can access the FollowMyHealth Patient Portal offered by [facility] by registering at the following website: [email or web address]. By joining [facility]’s FollowMyHealth portal, you will also be able to view your health information using other applications (apps) compatible with our system.

## 2021-01-16 NOTE — DISCHARGE NOTE PROVIDER - HOSPITAL COURSE
The patient underwent a LEFT TOTAL KNEE REVISION on 1/14/21. The patient received antibiotics consistent with SCIP guidelines. The patient underwent the procedure and had no intra-operative complications. Post-operatively, the patient was seen by medicine and PT. The patient received ASA for clot prevention. The patient received pain medications per orthopedic pain management pathway and the pain was appropriately controlled. The patient did not have any post-operative medical complications. The patient was discharged in stable condition.

## 2021-01-16 NOTE — PROGRESS NOTE ADULT - SUBJECTIVE AND OBJECTIVE BOX
ELDER HERNANDEZ  [id number]    History: 55y Male is status post left total knee revision, POD # 2. Patient is doing well and is comfortable. The patient's pain is controlled using the prescribed pain medications. The patient is participating in physical therapy. Denies nausea, vomiting, chest pain, shortness of breath, abdominal pain or fever. No new complaints.    Vital Signs Last 24 Hrs  T(C): 36.8 (16 Jan 2021 07:35), Max: 36.9 (15 Kevin 2021 19:51)  T(F): 98.2 (16 Jan 2021 07:35), Max: 98.4 (15 Kevin 2021 19:51)  HR: 80 (16 Jan 2021 07:35) (70 - 80)  BP: 120/78 (16 Jan 2021 07:35) (108/67 - 132/83)  BP(mean): --  RR: 19 (16 Jan 2021 07:35) (18 - 19)  SpO2: 97% (16 Jan 2021 07:35) (96% - 97%)    MEDICATIONS  (STANDING):  acetaminophen   Tablet .. 975 milliGRAM(s) Oral every 8 hours  aspirin enteric coated 81 milliGRAM(s) Oral every 12 hours  celecoxib 200 milliGRAM(s) Oral every 12 hours  cephalexin 500 milliGRAM(s) Oral four times a day  diVALproex  milliGRAM(s) Oral daily  HYDROmorphone  Injectable 0.5 milliGRAM(s) IV Push once  lactated ringers. 1000 milliLiter(s) (125 mL/Hr) IV Continuous <Continuous>  pantoprazole    Tablet 40 milliGRAM(s) Oral before breakfast  senna 2 Tablet(s) Oral at bedtime  sucralfate 1 Gram(s) Oral every 6 hours    MEDICATIONS  (PRN):  HYDROmorphone   Tablet 4 milliGRAM(s) Oral every 3 hours PRN Severe Pain (7 - 10)  ondansetron Injectable 4 milliGRAM(s) IV Push every 6 hours PRN Nausea and/or Vomiting  oxyCODONE    IR 10 milliGRAM(s) Oral every 3 hours PRN Mild Pain (1 - 3)  oxyCODONE    IR 15 milliGRAM(s) Oral every 3 hours PRN Moderate Pain (4 - 6)    Physical exam: The left knee ACE dressing is clean, dry and intact. Dressing removed. Mepilex dressing noted to have dried bloody drainage from mid to distal dressing. Dressing removed. Incision healing well, No active drainage from incision. Reinforced distal incision/ prineo with dermabond. New Mepilex dressing applied. The calf is supple nontender. Passive range of motion is acceptable to due postoperative pain. Sensation to light touch is grossly intact distally. Motor function distally is 5/5. 2+ dorsalis pedis pulse. Capillary refill is less than 2 seconds. No cyanosis.    Primary Orthopedic Assessment:  • s/p LEFT total knee revision    Secondary  Medical Assessment(s):   • DM, HTN, Hx left foot drop	    Plan:   •	DVT prophylaxis as prescribed, including use of compression devices and ankle pumps  •	Continue physical therapy  •	Weightbearing as tolerated of the left lower extremity with assistance of a walker  •	Incentive spirometry encouraged  •	Pain control as clinically indicated  •	Discharge planning – anticipated discharge is Home today when cleared by PT and Medicine       ELDER HERNANDEZ  [id number]    History: 55y Male is status post left total knee revision, POD # 2. Patient is doing well and is comfortable. The patient's pain is controlled using the prescribed pain medications. The patient is participating in physical therapy. Denies nausea, vomiting, chest pain, shortness of breath, abdominal pain or fever. No new complaints.    Vital Signs Last 24 Hrs  T(C): 36.8 (16 Jan 2021 07:35), Max: 36.9 (15 Kevin 2021 19:51)  T(F): 98.2 (16 Jan 2021 07:35), Max: 98.4 (15 Kevin 2021 19:51)  HR: 80 (16 Jan 2021 07:35) (70 - 80)  BP: 120/78 (16 Jan 2021 07:35) (108/67 - 132/83)  BP(mean): --  RR: 19 (16 Jan 2021 07:35) (18 - 19)  SpO2: 97% (16 Jan 2021 07:35) (96% - 97%)    AM labs pending    MEDICATIONS  (STANDING):  acetaminophen   Tablet .. 975 milliGRAM(s) Oral every 8 hours  aspirin enteric coated 81 milliGRAM(s) Oral every 12 hours  celecoxib 200 milliGRAM(s) Oral every 12 hours  cephalexin 500 milliGRAM(s) Oral four times a day  diVALproex  milliGRAM(s) Oral daily  HYDROmorphone  Injectable 0.5 milliGRAM(s) IV Push once  lactated ringers. 1000 milliLiter(s) (125 mL/Hr) IV Continuous <Continuous>  pantoprazole    Tablet 40 milliGRAM(s) Oral before breakfast  senna 2 Tablet(s) Oral at bedtime  sucralfate 1 Gram(s) Oral every 6 hours    MEDICATIONS  (PRN):  HYDROmorphone   Tablet 4 milliGRAM(s) Oral every 3 hours PRN Severe Pain (7 - 10)  ondansetron Injectable 4 milliGRAM(s) IV Push every 6 hours PRN Nausea and/or Vomiting  oxyCODONE    IR 10 milliGRAM(s) Oral every 3 hours PRN Mild Pain (1 - 3)  oxyCODONE    IR 15 milliGRAM(s) Oral every 3 hours PRN Moderate Pain (4 - 6)    Physical exam: The left knee ACE dressing is clean, dry and intact. Dressing removed. Mepilex dressing noted to have dried bloody drainage from mid to distal dressing. Dressing removed. Incision healing well, No active drainage from incision. Reinforced distal incision/ prineo with dermabond. New Mepilex dressing applied. The calf is supple nontender. Passive range of motion is acceptable to due postoperative pain. Sensation to light touch is grossly intact distally. Motor function distally is 5/5. 2+ dorsalis pedis pulse. Capillary refill is less than 2 seconds. No cyanosis.    Primary Orthopedic Assessment:  • s/p LEFT total knee revision    Secondary  Medical Assessment(s):   • DM, HTN, Hx left foot drop	    Plan:   •	DVT prophylaxis as prescribed, including use of compression devices and ankle pumps  •	Continue physical therapy  •	Weightbearing as tolerated of the left lower extremity with assistance of a walker  •	Incentive spirometry encouraged  •	Pain control as clinically indicated  •	Discharge planning – anticipated discharge is Home today when cleared by PT and Medicine

## 2021-01-16 NOTE — PROGRESS NOTE ADULT - ASSESSMENT
55 years old male with PMH DM (Diet controlled), HTN, HLD (Diet Controlled), Asthma and Bipolar Disorder comes to PST with c/o left leg pain.   He has chronic lateral knee pain. He reports a history of at least 5 past knee surgeries. Initial injury was due to a trauma. Patient reports recent work up revealed a failed hardware.      Problem/Recommendation - 1:  Problem: Failure of total knee replacement, initial encounter. Recommendation: , s/p minor L TKA revision   PT/OT/pain mgmt  DVT prophylaxis- as per ortho  Abx as per SCIP- given   Incentive spirometry  Prophylaxis of opioid  induced constipation.     Problem/Recommendation - 2:  ·  Problem: Essential hypertension.  Recommendation: - continue home meds with parameters.      Problem/Recommendation - 3:  ·  Problem: Hyperlipidemia, unspecified hyperlipidemia type.  Recommendation: - diet controlled.      Problem/Recommendation - 4:  ·  Problem: Type 2 diabetes mellitus without complication   Recommendation: - diet controlled.     HgA1C - 6.0      Problem/Recommendation - 5:  ·  Problem: Gastritis.  Recommendation: - continue PPI.      Problem/Recommendation - 6:  Problem: Bipolar disorder. Recommendation: - continue home meds.     Problem/Recommendation - 7:  Problem: Asthma. Recommendation: - stable.    Dispo: Medically stable for discharge.

## 2021-01-16 NOTE — OCCUPATIONAL THERAPY INITIAL EVALUATION ADULT - ADDITIONAL COMMENTS
Pt lives in private home with no steps to enter, no steps inside.  Pt drives.  Pt has RW, SAC, commode,  shower chair, 3 grab bars in tub.

## 2021-01-16 NOTE — PROGRESS NOTE ADULT - SUBJECTIVE AND OBJECTIVE BOX
ELDER HERNANDEZ    [id number]    55y      Male    CC: Left knee pain s/p Left knee revision POD#2  Doing well, pain is well controlled , ambulated with PT,tolerating po, urinating without any issues. denies any light headedness/dizziness on ambulation      INTERVAL HPI/OVERNIGHT EVENTS: no acute events    REVIEW OF SYSTEMS:    CONSTITUTIONAL: No fever,  or fatigue  RESPIRATORY: No cough, wheezing,  No shortness of breath  CARDIOVASCULAR: No chest pain, palpitations  GASTROINTESTINAL: No abdominal or epigastric pain. No nausea, vomiting        Vital Signs Last 24 Hrs  T(C): 36.8 (16 Jan 2021 07:35), Max: 36.9 (15 Kevin 2021 19:51)  T(F): 98.2 (16 Jan 2021 07:35), Max: 98.4 (15 Kevin 2021 19:51)  HR: 80 (16 Jan 2021 07:35) (70 - 80)  BP: 120/78 (16 Jan 2021 07:35) (108/67 - 132/83)  BP(mean): --  RR: 19 (16 Jan 2021 07:35) (18 - 19)  SpO2: 97% (16 Jan 2021 07:35) (96% - 97%)      PHYSICAL EXAM:    GENERAL: NAD, well-groomed  HEENT: PERRL, +EOMI  NECK: soft, Supple, No JVD,   CHEST/LUNG: Clear to percussion bilaterally; No wheezing  HEART: S1S2+, Regular rate and rhythm; No murmurs  EXTREMITIES:  No clubbing, cyanosis, or edema  SKIN: No rashes or lesions  NEURO: AAOX3      01-15 @ 07:01  -  01-16 @ 07:00  --------------------------------------------------------  IN: 0 mL / OUT: 900 mL / NET: -900 mL        LABS:                        13.4   8.41  )-----------( 277      ( 16 Jan 2021 10:06 )             41.0     01-16    139  |  105  |  10.0  ----------------------------<  130<H>  4.1   |  24.0  |  0.66    Ca    8.7      16 Jan 2021 10:06              MEDICATIONS  (STANDING):  acetaminophen   Tablet .. 975 milliGRAM(s) Oral every 8 hours  aspirin enteric coated 81 milliGRAM(s) Oral every 12 hours  celecoxib 200 milliGRAM(s) Oral every 12 hours  cephalexin 500 milliGRAM(s) Oral four times a day  diVALproex  milliGRAM(s) Oral daily  HYDROmorphone  Injectable 0.5 milliGRAM(s) IV Push once  lactated ringers. 1000 milliLiter(s) (125 mL/Hr) IV Continuous <Continuous>  pantoprazole    Tablet 40 milliGRAM(s) Oral before breakfast  senna 2 Tablet(s) Oral at bedtime  sucralfate 1 Gram(s) Oral every 6 hours    MEDICATIONS  (PRN):  HYDROmorphone   Tablet 4 milliGRAM(s) Oral every 3 hours PRN Severe Pain (7 - 10)  ondansetron Injectable 4 milliGRAM(s) IV Push every 6 hours PRN Nausea and/or Vomiting  oxyCODONE    IR 10 milliGRAM(s) Oral every 3 hours PRN Mild Pain (1 - 3)  oxyCODONE    IR 15 milliGRAM(s) Oral every 3 hours PRN Moderate Pain (4 - 6)      RADIOLOGY & ADDITIONAL TESTS:

## 2021-01-16 NOTE — DISCHARGE NOTE PROVIDER - NSDCFUADDINST_GEN_ALL_CORE_FT
The patient will be seen in the office between 2-3 weeks for wound check.   **Your first post-operative visit has been scheduled prior to your admission. PLEASE CONTACT OFFICE TO CONFIRM THE APPOINTMENT DATE. Tape will be removed at that time.  **  The silver based dressing is to be removed 7 days from the date of surgery.   ** CONTACT THE OFFICE IF THE FOLLOWING DEVELOP:  - the dressing becomes soiled or saturated  - you develop a fever greater that 101F  - the wound becomes red or you develop blistering around the wound  * Patient may shower after post-op day #3.   * The patient will continue home PT consistent with  total knee replacement protocol. Transition to outpatient PT will occur at the time of the first office visit.   * The patient will practice knee extension exercises regularly to minimize hamstring contraction.   * The patient is FULL weight bearing.  * The patient will continue ASPIRIN for 6 weeks after surgery for blood clot prevention.  *** The patient will continue ASPIRIN for blood clot prevention. *** While on aspirin, the patient will take daily omeprazole or other similar medication to protect the stomach from irritation.   * The patient will take OXYCODONE AND TYLENOL for pain control and adjust according to prescription and patient needs. Contact the office if pain increases while taking prescribed pain medications or related concerns develop.  * Celebrex will be taken twice daily for 3 weeks for pain control and prevention of excessive bone growth. Additional prescription may be requested at your office follow-up visit.   * The patient will take Senna S while taking oxycodone to prevent narcotic associated constipation.  Additionally, increase water intake (drink at least 8 glasses of water daily) and try adding fiber to the diet by eating fruits, vegetables and foods that are rich in grains. If constipation is experienced, contact the medical/primary care provider to discuss further treatment options.  * To avoid injury at home:  - continue use of rolling walker until cleared by physical therapist  - have family or friend remove all throw rug or objects in hallways that may present a trip hazard.  - if you experience any dizziness or medical concerns, call your medical doctor or  911.  * The implant may activate metal detection devices.

## 2021-01-16 NOTE — DISCHARGE NOTE PROVIDER - NSDCCPCAREPLAN_GEN_ALL_CORE_FT
PRINCIPAL DISCHARGE DIAGNOSIS  Diagnosis: Failed total knee replacement  Assessment and Plan of Treatment:

## 2021-01-16 NOTE — DISCHARGE NOTE PROVIDER - CARE PROVIDER_API CALL
Hugo Caputo)  Orthopedics  [address], [address]  [place] [address]  Phone: [phone]  Fax: [phone]  Follow Up Time:

## 2021-01-20 LAB
CULTURE RESULTS: SIGNIFICANT CHANGE UP
SPECIMEN SOURCE: SIGNIFICANT CHANGE UP

## 2021-01-21 LAB — SURGICAL PATHOLOGY STUDY: SIGNIFICANT CHANGE UP

## 2021-01-22 ENCOUNTER — APPOINTMENT (OUTPATIENT)
Dept: ORTHOPEDIC SURGERY | Facility: CLINIC | Age: 56
End: 2021-01-22
Payer: MEDICARE

## 2021-01-22 VITALS — TEMPERATURE: 97.6 F

## 2021-01-22 PROCEDURE — 99024 POSTOP FOLLOW-UP VISIT: CPT

## 2021-01-22 PROCEDURE — 73562 X-RAY EXAM OF KNEE 3: CPT | Mod: RT

## 2021-01-22 NOTE — HISTORY OF PRESENT ILLNESS
[2] : the patient reports pain that is 2/10 in severity [Chills] : no chills [Constipation] : no constipation [Diarrhea] : no diarrhea [Dysuria] : no dysuria [Fever] : no fever [Nausea] : no nausea [Vomiting] : no vomiting [Excellent Pain Control] : has excellent pain control [Doing Well] : is doing well [No Sign of Infection] : is showing no signs of infection [de-identified] : left knee revision DO 1/14/2021 [de-identified] : Is a 55-year-old male who presents today for evaluation regarding his left knee. He is status post revision of left total knee replacement on January 14, 2001. He presents today for a wound check. He states that following surgery he's noticed drainage/bleeding from his left knee. He said this has been occurring since the date is discharged. It has been followed with home care as well as visiting nurse. He states the dressings have been changed daily and was last treated yesterday. He presently presents today stating that he does have drainage with a new bandage applied yesterday. Overall the pain in his knee is doing fairly well and is slowly improving. He has been receiving physical therapy as well at home. He denies any history of injury or accident. [de-identified] : On physical examination the left knee. Patient is status post revision of left total knee replacement. Patient is currently in an Ace bandage which has blood shoulder may be. Upon removal of it is noted that there is some bloody discharge stemming from the most distal portion of the incision. There is a cranial dressing the incision except for the very lower portion. The surrounding skin is clean dry and intact. With no redness swelling heat or discharge noted. Very mild diffuse pain and tenderness noted. The overall alignment is within neutral position. No evidence of any motor or sensory deficit. Patient has good distal pulses with no calf tenderness. Patient was cleaned and the area was sterilized. For sutures were placed at the distal portion of the wound without incident. And then a Previna dressing was applied. He was also placed in a knee immobilizer. [de-identified] : X-rays of the leftknee reveal status post revision of left total knee replacement. The hardware is in place and shows excellent alignment as well as fixation. There is no evidence of fracture dislocation or loosening of the hardware. [de-identified] : status post revision of left total knee replacement [de-identified] : Plan the patient is to continue with her present activities. He is advised to continue with the knee immobilizer as well as the dahlia dressing. He is instructed to continue with leg elevation along with ice packs and medication for pain as needed. It is suggested that he return to the office in another week for reevaluation. If the bleeding has resolved. He may then continue with physical therapy without the knee immobilizer.

## 2021-02-05 ENCOUNTER — APPOINTMENT (OUTPATIENT)
Dept: ORTHOPEDIC SURGERY | Facility: CLINIC | Age: 56
End: 2021-02-05
Payer: MEDICARE

## 2021-02-05 VITALS
WEIGHT: 175 LBS | SYSTOLIC BLOOD PRESSURE: 118 MMHG | HEART RATE: 88 BPM | BODY MASS INDEX: 25.92 KG/M2 | DIASTOLIC BLOOD PRESSURE: 80 MMHG | HEIGHT: 69 IN | TEMPERATURE: 97.7 F

## 2021-02-05 DIAGNOSIS — M54.5 LOW BACK PAIN: ICD-10-CM

## 2021-02-05 PROCEDURE — 99024 POSTOP FOLLOW-UP VISIT: CPT

## 2021-02-12 ENCOUNTER — APPOINTMENT (OUTPATIENT)
Dept: ORTHOPEDIC SURGERY | Facility: CLINIC | Age: 56
End: 2021-02-12
Payer: MEDICARE

## 2021-02-12 VITALS — TEMPERATURE: 96.4 F

## 2021-02-12 DIAGNOSIS — Z96.652 PRESENCE OF LEFT ARTIFICIAL KNEE JOINT: ICD-10-CM

## 2021-02-12 PROCEDURE — 73562 X-RAY EXAM OF KNEE 3: CPT | Mod: LT

## 2021-02-12 PROCEDURE — 99024 POSTOP FOLLOW-UP VISIT: CPT | Mod: 26

## 2021-02-12 NOTE — HISTORY OF PRESENT ILLNESS
[de-identified] : left TKR 1/14/2021 [de-identified] : Patient presents today for her 6 week followup status post left total knee arthroplasty. He was seen last week. He reports the wound is healed well. He continues to use of a brace. He is scheduled to begin outpatient physical therapy next Wednesday. No  new injuries are reported. [de-identified] : Postop total knee replacement exam\par \par Exam of the left knee reveals that the patient is post knee replacement surgery.  The incision is clean, dry, and intact and well-healed. No wound dehisence noted. There is no erythema.   There is minimal effusion. No calf tenderness. No venous stasis or open skin wounds distally. No foot drop. Sensation is intact to light touch. \par \par 1- Alignment: measured on AP standing Xray Anatomic Alignment\par Neutral \par \par 2- Medial / Lateral Instability: measured in full extension \par Little\par \par \par 3- Anterior / Posterior Instability: measured at 90 degrees \par None \par \par 4 - Range of motion is from zero to 110 degrees. \par \par 5- Flexion Contracture no\par \par 6- Extensor Lag Minus Points no\par  [de-identified] : X-ray of the left knee was reviewed. Implants are in good position. No signs of loosening or fracture. [de-identified] : Status post left total knee arthroplasty at 6 weeks [de-identified] : X-rays reviewed the patient. He is clinically doing well. He will return back to this office in approximately 6 weeks' time for evaluation. He'll begin outpatient physical therapy this coming week. He'll continue the use of a cane. All questions were answered to the patient's satisfaction.

## 2021-03-19 ENCOUNTER — APPOINTMENT (OUTPATIENT)
Dept: ORTHOPEDIC SURGERY | Facility: CLINIC | Age: 56
End: 2021-03-19

## 2021-03-26 ENCOUNTER — APPOINTMENT (OUTPATIENT)
Dept: ORTHOPEDIC SURGERY | Facility: CLINIC | Age: 56
End: 2021-03-26
Payer: MEDICARE

## 2021-03-26 DIAGNOSIS — Z96.652 PRESENCE OF LEFT ARTIFICIAL KNEE JOINT: ICD-10-CM

## 2021-03-26 PROCEDURE — 99024 POSTOP FOLLOW-UP VISIT: CPT | Mod: 26

## 2021-03-26 PROCEDURE — 73562 X-RAY EXAM OF KNEE 3: CPT | Mod: LT

## 2021-03-26 NOTE — HISTORY OF PRESENT ILLNESS
[de-identified] : left TKR 1/14/2021 [de-identified] : Patient presents today for followup status post left total knee revision arthroplasty. he continues outpatient physical therapy. He reports the leg is slowly getting stronger. He reports of improved motion. He denies any wound complications. No new trauma. He continues to use of a cane. No  new injuries are reported. [de-identified] : Postop total knee replacement exam\par \par Exam of the left knee reveals that the patient is post knee replacement surgery.  The incision is clean, dry, and intact and well-healed. No wound dehiscence noted. There is no erythema.   There is minimal effusion. No calf tenderness. No venous stasis or open skin wounds distally. No foot drop. Sensation is intact to light touch. \par \par 1- Alignment: measured on AP standing Xray Anatomic Alignment\par Neutral \par \par 2- Medial / Lateral Instability: measured in full extension \par Little\par \par \par 3- Anterior / Posterior Instability: measured at 90 degrees \par None \par \par 4 - Range of motion is from zero to 110 degrees. \par \par 5- Flexion Contracture no\par \par 6- Extensor Lag Minus Points yes: less than 5°\par  [de-identified] : X-ray of the left knee was reviewed. revision stemmed implant of the tibia is in good position. No signs of loosening or fracture. [de-identified] : Status post left total knee arthroplasty at 2 and half months [de-identified] : X-rays reviewed the patient. He is clinically doing well. He will return back to this office in approximately 8 weeks' time for evaluation. He'll continue outpatient physical therapy. He'll continue the use of a cane. All questions were answered to the patient's satisfaction.

## 2021-05-03 ENCOUNTER — NON-APPOINTMENT (OUTPATIENT)
Age: 56
End: 2021-05-03

## 2021-06-29 ENCOUNTER — APPOINTMENT (OUTPATIENT)
Dept: GASTROENTEROLOGY | Facility: CLINIC | Age: 56
End: 2021-06-29

## 2021-12-02 NOTE — DISCHARGE NOTE PROVIDER - CARE PROVIDERS DIRECT ADDRESSES
[TextBox_4] : 66 year old male with DMII and atrial fibrillation on Eliquis presents for follow up post COVID pneumonia.  He continues to be mildly hypoxemic with exertion but has improved greatly.  His oxygen saturations are around 91-97% with exertion without O2.  He uses the oxygen at night when he's sleeping.  \par The cough has improved as well.  He has been taking nasal fluticasone, which has been helpful.  \par Overall, pt is feeling much better compared to a couple of months ago.  He is back to his daily routines without feeling fatigued.  [email or web address]

## 2022-07-15 ENCOUNTER — NON-APPOINTMENT (OUTPATIENT)
Age: 57
End: 2022-07-15

## 2022-08-13 NOTE — ED ADULT TRIAGE NOTE - NSWEIGHTCALCTOOLDRUG_GEN_A_CORE
used
Orientation to room/Bed in low position, brakes on/Call light is within reach, educate patient/family on its functionality/Patient and family education available to parents and patient

## 2022-09-06 ENCOUNTER — APPOINTMENT (OUTPATIENT)
Dept: GASTROENTEROLOGY | Facility: CLINIC | Age: 57
End: 2022-09-06

## 2022-10-24 NOTE — ED ADULT NURSE NOTE - PAIN: BODY LOCATION
hand/Right: Tranexamic Acid Counseling:  Patient advised of the small risk of bleeding problems with tranexamic acid. They were also instructed to call if they developed any nausea, vomiting or diarrhea. All of the patient's questions and concerns were addressed.

## 2023-09-14 NOTE — ED ADULT NURSE NOTE - BREATHING, MLM
Addended by: MAURILIO MAXWELL on: 9/14/2023 11:18 AM     Modules accepted: Orders     Spontaneous, unlabored and symmetrical

## 2023-09-21 NOTE — ED ADULT NURSE NOTE - MODE OF DISCHARGE
On 9/16/23 received \"Return Mail\" for New Patient packet ~ Vacant, unable to forward. Attempted to call patient to update mailing address, but he was not able to provide it. He states they moved and he will have his wife call back with the new address. Thank you.
Ambulatory

## 2024-12-03 NOTE — ED ADULT NURSE NOTE - NS ED NURSE LEVEL OF CONSCIOUSNESS SPEECH
Patient is scheduled for surgery with Dr. Cason    Spoke with: patient    Date of Surgery: 1/14/25    Location: [facility]    Post op: 1/27/25    H&P: scheduled with PAC 12/23/24    Additional imaging/appointments: N/A    Surgery packet: received     Additional comments: N/A    Prachi  Perioperative   [phone]     Speaking Coherently